# Patient Record
Sex: MALE | Race: WHITE | URBAN - METROPOLITAN AREA
[De-identification: names, ages, dates, MRNs, and addresses within clinical notes are randomized per-mention and may not be internally consistent; named-entity substitution may affect disease eponyms.]

---

## 2017-01-11 ENCOUNTER — CARE COORDINATION (OUTPATIENT)
Dept: GASTROENTEROLOGY | Facility: CLINIC | Age: 62
End: 2017-01-11

## 2017-01-11 DIAGNOSIS — K85.90 PANCREATITIS: Primary | ICD-10-CM

## 2017-01-12 ENCOUNTER — TRANSFERRED RECORDS (OUTPATIENT)
Dept: HEALTH INFORMATION MANAGEMENT | Facility: CLINIC | Age: 62
End: 2017-01-12

## 2017-01-12 ENCOUNTER — TELEPHONE (OUTPATIENT)
Dept: GASTROENTEROLOGY | Facility: CLINIC | Age: 62
End: 2017-01-12

## 2017-01-12 ENCOUNTER — CARE COORDINATION (OUTPATIENT)
Dept: GASTROENTEROLOGY | Facility: CLINIC | Age: 62
End: 2017-01-12

## 2017-01-12 NOTE — PROGRESS NOTES
Asked to call Prince because he has questions regarding scheduling his ERCP (see letter from Dr. Heredia 1/11/2017)    Prince states he would prefer to wait to schedule ERCP until after the hydrogen breath test results come back. Testing was done 1/11/2017. He is not sure how long it will take for the test results to be sent back to his clinic. He is aware we will need the test results sent to us here as well.     We will wait to hear from him. He is amenable to the plan.     Maggie CHERRY, RN Coordinator  Dr. Carolina, Dr. Heredia & Dr. Thompson  [facility]  [phone] #4

## 2017-01-12 NOTE — TELEPHONE ENCOUNTER
Called Prince to schedule ERCP. He is wondering why ERCP, he has had two done in the past. States that he also had an MRCP done recently. He says he lives 6 hours away and is concerned about fasting whilst taking about 7 shots of insulin per day. He asked me to speak to a nurse before I add him on the OR schedule.   Will route this to Maggie to call pt and address any concerns, then I will call him to schedule.     SR 01/12/2017  158p

## 2017-02-01 ENCOUNTER — CARE COORDINATION (OUTPATIENT)
Dept: GASTROENTEROLOGY | Facility: CLINIC | Age: 62
End: 2017-02-01

## 2017-02-01 NOTE — PROGRESS NOTES
Received call from Sowmya OROZCO at [facility], called to let us know Prince's Hydrogen breath test is done and she will fax the results to us.   Fax number given.     Maggie CHERRY RN Coordinator  Dr. Carolina, Dr. Heredia & Dr. Thompson  [facility]  [phone] #4

## 2017-02-02 ENCOUNTER — CARE COORDINATION (OUTPATIENT)
Dept: GASTROENTEROLOGY | Facility: CLINIC | Age: 62
End: 2017-02-02

## 2017-02-02 DIAGNOSIS — K58.0 IRRITABLE BOWEL SYNDROME WITH DIARRHEA: Primary | ICD-10-CM

## 2017-02-02 NOTE — PROGRESS NOTES
Called patient and let him know i will send RX for antibiotic to his pharmacy [phone], he also wanted to know what the next step was? He wasn't sure if he should be waiting until done with abx before doing ERCP. Also he is not on enzyme at this time, creon the last time 36,000 made him sick. We discussed trying another enzyme which i will discuss with Dr. Heredia and call patient back with recommendations and plan.

## 2017-02-03 ENCOUNTER — CARE COORDINATION (OUTPATIENT)
Dept: GASTROENTEROLOGY | Facility: CLINIC | Age: 62
End: 2017-02-03

## 2017-02-03 ENCOUNTER — TELEPHONE (OUTPATIENT)
Dept: GASTROENTEROLOGY | Facility: CLINIC | Age: 62
End: 2017-02-03

## 2017-02-03 NOTE — TELEPHONE ENCOUNTER
Prince is calling to get some possible dates for his ERCP:    Dates offered:   02/27/2017 at 2 PM   03/01/2017 at 11 AM  03/08/2017 at 8:15 AM    Patient knows these dates may not be available if he does not respond to me by Monday.      SR 02/03/201  350PM

## 2017-02-06 ENCOUNTER — TELEPHONE (OUTPATIENT)
Dept: GASTROENTEROLOGY | Facility: CLINIC | Age: 62
End: 2017-02-06

## 2017-02-06 ENCOUNTER — CARE COORDINATION (OUTPATIENT)
Dept: GASTROENTEROLOGY | Facility: CLINIC | Age: 62
End: 2017-02-06

## 2017-02-06 NOTE — PROGRESS NOTES
Called Prince because I received a message he had questions about the procedure.     Asked how long before he can be driven home after procedure: advised that it is recommended he stay in the area for 24 hours after the procedure. He will talk it over with his .     Advised to get pre-op physical and talk to PCP about his diabetes, especially since his blood sugars are so unstable.     Contact information verified for future questions/concerns.   Maggie CHERRY RN Coordinator  Dr. Carolina, Dr. Heredia & Dr. Thompson  [facility]  [phone] #4

## 2017-02-06 NOTE — TELEPHONE ENCOUNTER
Prince is informed that he is scheduled on 02/27/2017 at 2 PM with an arrival time of 12 P. Prince already has a  designated to pick him up. He stated that he recently had a pre-op physical, I advised that this needs to be done within 30 days of the procedure as anesthesia will be administered. Patient verbalized understanding. I also informed him that if it is outdated by a couple days, Dr. Heredia will update on the day of.   He knows he will need someone to monitor him for 24 hours post procedure.   All instructions will be mailed to Prince at his address listed in EPIC, it was verified.     SR 02/06/2017  1108a

## 2017-02-08 ENCOUNTER — CARE COORDINATION (OUTPATIENT)
Dept: GASTROENTEROLOGY | Facility: CLINIC | Age: 62
End: 2017-02-08

## 2017-02-08 NOTE — PROGRESS NOTES
Jason called wanting clarification in regards to the Xifaxin prescription. Advised he was to take it 2x day for 10 days as written and I believe the 6 refills were a mistake.   Verbalized understanding.     Maggie CHERRY RN Coordinator  Dr. Carolina, Dr. Heredia & Dr. Thompson  [facility]  [phone] #4

## 2017-02-15 DIAGNOSIS — K58.0 IRRITABLE BOWEL SYNDROME WITH DIARRHEA: Primary | ICD-10-CM

## 2017-02-24 ENCOUNTER — TRANSFERRED RECORDS (OUTPATIENT)
Dept: HEALTH INFORMATION MANAGEMENT | Facility: CLINIC | Age: 62
End: 2017-02-24

## 2017-02-27 ENCOUNTER — ANESTHESIA EVENT (OUTPATIENT)
Dept: SURGERY | Facility: CLINIC | Age: 62
DRG: 438 | End: 2017-02-27
Payer: MEDICARE

## 2017-02-27 ENCOUNTER — HOSPITAL ENCOUNTER (INPATIENT)
Facility: CLINIC | Age: 62
LOS: 5 days | Discharge: HOME | DRG: 438 | End: 2017-03-04
Attending: INTERNAL MEDICINE | Admitting: INTERNAL MEDICINE
Payer: MEDICARE

## 2017-02-27 ENCOUNTER — ANESTHESIA (OUTPATIENT)
Dept: SURGERY | Facility: CLINIC | Age: 62
DRG: 438 | End: 2017-02-27
Payer: MEDICARE

## 2017-02-27 ENCOUNTER — APPOINTMENT (OUTPATIENT)
Dept: GENERAL RADIOLOGY | Facility: CLINIC | Age: 62
DRG: 438 | End: 2017-02-27
Attending: INTERNAL MEDICINE
Payer: MEDICARE

## 2017-02-27 DIAGNOSIS — Z79.4 TYPE 2 DIABETES MELLITUS WITH HYPERGLYCEMIA, WITH LONG-TERM CURRENT USE OF INSULIN (H): ICD-10-CM

## 2017-02-27 DIAGNOSIS — K85.81 OTHER ACUTE PANCREATITIS WITH UNINFECTED NECROSIS: Primary | ICD-10-CM

## 2017-02-27 DIAGNOSIS — K59.01 SLOW TRANSIT CONSTIPATION: ICD-10-CM

## 2017-02-27 DIAGNOSIS — E11.65 TYPE 2 DIABETES MELLITUS WITH HYPERGLYCEMIA, WITH LONG-TERM CURRENT USE OF INSULIN (H): ICD-10-CM

## 2017-02-27 PROBLEM — K85.90 ACUTE PANCREATITIS: Status: ACTIVE | Noted: 2017-02-27

## 2017-02-27 PROBLEM — G89.18 POST-OP PAIN: Status: ACTIVE | Noted: 2017-02-27

## 2017-02-27 LAB
ALBUMIN SERPL-MCNC: 4.2 G/DL (ref 3.4–5)
ALP SERPL-CCNC: 59 U/L (ref 40–150)
ALT SERPL W P-5'-P-CCNC: 47 U/L (ref 0–70)
AMYLASE SERPL-CCNC: 141 U/L (ref 30–110)
AMYLASE SERPL-CCNC: 62 U/L (ref 30–110)
ANION GAP SERPL CALCULATED.3IONS-SCNC: 8 MMOL/L (ref 3–14)
AST SERPL W P-5'-P-CCNC: 15 U/L (ref 0–45)
BILIRUB SERPL-MCNC: 0.6 MG/DL (ref 0.2–1.3)
BUN SERPL-MCNC: 22 MG/DL (ref 7–30)
CALCIUM SERPL-MCNC: 8.9 MG/DL (ref 8.5–10.1)
CHLORIDE SERPL-SCNC: 100 MMOL/L (ref 94–109)
CO2 SERPL-SCNC: 27 MMOL/L (ref 20–32)
CREAT SERPL-MCNC: 1.2 MG/DL (ref 0.66–1.25)
ERCP: NORMAL
ERYTHROCYTE [DISTWIDTH] IN BLOOD BY AUTOMATED COUNT: 12.6 % (ref 10–15)
GFR SERPL CREATININE-BSD FRML MDRD: 62 ML/MIN/1.7M2
GLUCOSE BLDC GLUCOMTR-MCNC: 218 MG/DL (ref 70–99)
GLUCOSE BLDC GLUCOMTR-MCNC: 220 MG/DL (ref 70–99)
GLUCOSE BLDC GLUCOMTR-MCNC: 251 MG/DL (ref 70–99)
GLUCOSE BLDC GLUCOMTR-MCNC: 252 MG/DL (ref 70–99)
GLUCOSE SERPL-MCNC: 235 MG/DL (ref 70–99)
HCT VFR BLD AUTO: 36.4 % (ref 40–53)
HGB BLD-MCNC: 12.8 G/DL (ref 13.3–17.7)
INR PPP: 1.07 (ref 0.86–1.14)
LIPASE SERPL-CCNC: 2000 U/L (ref 73–393)
LIPASE SERPL-CCNC: 353 U/L (ref 73–393)
MCH RBC QN AUTO: 29.2 PG (ref 26.5–33)
MCHC RBC AUTO-ENTMCNC: 35.2 G/DL (ref 31.5–36.5)
MCV RBC AUTO: 83 FL (ref 78–100)
PLATELET # BLD AUTO: 213 10E9/L (ref 150–450)
POTASSIUM SERPL-SCNC: 4 MMOL/L (ref 3.4–5.3)
PROT SERPL-MCNC: 7.9 G/DL (ref 6.8–8.8)
RBC # BLD AUTO: 4.39 10E12/L (ref 4.4–5.9)
SODIUM SERPL-SCNC: 134 MMOL/L (ref 133–144)
WBC # BLD AUTO: 7.7 10E9/L (ref 4–11)

## 2017-02-27 PROCEDURE — 27210794 ZZH OR GENERAL SUPPLY STERILE: Performed by: INTERNAL MEDICINE

## 2017-02-27 PROCEDURE — 25000566 ZZH SEVOFLURANE, EA 15 MIN: Performed by: INTERNAL MEDICINE

## 2017-02-27 PROCEDURE — 12000001 ZZH R&B MED SURG/OB UMMC

## 2017-02-27 PROCEDURE — 40000170 ZZH STATISTIC PRE-PROCEDURE ASSESSMENT II: Performed by: INTERNAL MEDICINE

## 2017-02-27 PROCEDURE — 83690 ASSAY OF LIPASE: CPT | Performed by: INTERNAL MEDICINE

## 2017-02-27 PROCEDURE — 36415 COLL VENOUS BLD VENIPUNCTURE: CPT | Performed by: INTERNAL MEDICINE

## 2017-02-27 PROCEDURE — 36000061 ZZH SURGERY LEVEL 3 W FLUORO 1ST 30 MIN - UMMC: Performed by: INTERNAL MEDICINE

## 2017-02-27 PROCEDURE — 71000015 ZZH RECOVERY PHASE 1 LEVEL 2 EA ADDTL HR: Performed by: INTERNAL MEDICINE

## 2017-02-27 PROCEDURE — 71000014 ZZH RECOVERY PHASE 1 LEVEL 2 FIRST HR: Performed by: INTERNAL MEDICINE

## 2017-02-27 PROCEDURE — 25000128 H RX IP 250 OP 636: Performed by: NURSE ANESTHETIST, CERTIFIED REGISTERED

## 2017-02-27 PROCEDURE — C1769 GUIDE WIRE: HCPCS | Performed by: INTERNAL MEDICINE

## 2017-02-27 PROCEDURE — 25800025 ZZH RX 258: Performed by: NURSE ANESTHETIST, CERTIFIED REGISTERED

## 2017-02-27 PROCEDURE — 80053 COMPREHEN METABOLIC PANEL: CPT | Performed by: INTERNAL MEDICINE

## 2017-02-27 PROCEDURE — C1726 CATH, BAL DIL, NON-VASCULAR: HCPCS | Performed by: INTERNAL MEDICINE

## 2017-02-27 PROCEDURE — 25000125 ZZHC RX 250: Performed by: NURSE ANESTHETIST, CERTIFIED REGISTERED

## 2017-02-27 PROCEDURE — 0F7D8DZ DILATION OF PANCREATIC DUCT WITH INTRALUMINAL DEVICE, VIA NATURAL OR ARTIFICIAL OPENING ENDOSCOPIC: ICD-10-PCS | Performed by: INTERNAL MEDICINE

## 2017-02-27 PROCEDURE — C1877 STENT, NON-COAT/COV W/O DEL: HCPCS | Performed by: INTERNAL MEDICINE

## 2017-02-27 PROCEDURE — 85027 COMPLETE CBC AUTOMATED: CPT | Performed by: INTERNAL MEDICINE

## 2017-02-27 PROCEDURE — 25000128 H RX IP 250 OP 636: Performed by: NURSE PRACTITIONER

## 2017-02-27 PROCEDURE — A9270 NON-COVERED ITEM OR SERVICE: HCPCS | Performed by: INTERNAL MEDICINE

## 2017-02-27 PROCEDURE — 25800025 ZZH RX 258: Performed by: INTERNAL MEDICINE

## 2017-02-27 PROCEDURE — 82150 ASSAY OF AMYLASE: CPT | Performed by: INTERNAL MEDICINE

## 2017-02-27 PROCEDURE — 40000277 XR SURGERY CARM FLUORO LESS THAN 5 MIN W STILLS: Mod: TC

## 2017-02-27 PROCEDURE — 25000125 ZZHC RX 250: Performed by: INTERNAL MEDICINE

## 2017-02-27 PROCEDURE — 37000008 ZZH ANESTHESIA TECHNICAL FEE, 1ST 30 MIN: Performed by: INTERNAL MEDICINE

## 2017-02-27 PROCEDURE — 85610 PROTHROMBIN TIME: CPT | Performed by: INTERNAL MEDICINE

## 2017-02-27 PROCEDURE — 99220 ZZC INITIAL OBSERVATION CARE,LEVL III: CPT | Mod: AI | Performed by: INTERNAL MEDICINE

## 2017-02-27 PROCEDURE — 99207 ZZC APP CREDIT; MD BILLING SHARED VISIT: CPT | Performed by: NURSE PRACTITIONER

## 2017-02-27 PROCEDURE — 25000125 ZZHC RX 250: Performed by: ANESTHESIOLOGY

## 2017-02-27 PROCEDURE — 00000146 ZZHCL STATISTIC GLUCOSE BY METER IP

## 2017-02-27 PROCEDURE — 27211024 ZZHC OR SUPPLY OTHER OPNP: Performed by: INTERNAL MEDICINE

## 2017-02-27 PROCEDURE — 37000009 ZZH ANESTHESIA TECHNICAL FEE, EACH ADDTL 15 MIN: Performed by: INTERNAL MEDICINE

## 2017-02-27 PROCEDURE — 36000059 ZZH SURGERY LEVEL 3 EA 15 ADDTL MIN UMMC: Performed by: INTERNAL MEDICINE

## 2017-02-27 PROCEDURE — C1725 CATH, TRANSLUMIN NON-LASER: HCPCS | Performed by: INTERNAL MEDICINE

## 2017-02-27 PROCEDURE — 25000132 ZZH RX MED GY IP 250 OP 250 PS 637: Mod: GY | Performed by: NURSE PRACTITIONER

## 2017-02-27 DEVICE — STENT ADVANIX PANCREA PIGTAIL 3FRX13CM M00536850
Type: IMPLANTABLE DEVICE | Site: PANCREATIC DUCT | Status: NON-FUNCTIONAL
Removed: 2017-03-20

## 2017-02-27 RX ORDER — NICOTINE POLACRILEX 4 MG
15-30 LOZENGE BUCCAL
Status: DISCONTINUED | OUTPATIENT
Start: 2017-02-27 | End: 2017-03-04 | Stop reason: HOSPADM

## 2017-02-27 RX ORDER — FLUMAZENIL 0.1 MG/ML
0.2 INJECTION, SOLUTION INTRAVENOUS
Status: ACTIVE | OUTPATIENT
Start: 2017-02-27 | End: 2017-02-28

## 2017-02-27 RX ORDER — HYDROMORPHONE HYDROCHLORIDE 1 MG/ML
.3-.5 INJECTION, SOLUTION INTRAMUSCULAR; INTRAVENOUS; SUBCUTANEOUS
Status: DISCONTINUED | OUTPATIENT
Start: 2017-02-27 | End: 2017-02-28

## 2017-02-27 RX ORDER — ONDANSETRON 2 MG/ML
4 INJECTION INTRAMUSCULAR; INTRAVENOUS EVERY 30 MIN PRN
Status: DISCONTINUED | OUTPATIENT
Start: 2017-02-27 | End: 2017-02-27 | Stop reason: HOSPADM

## 2017-02-27 RX ORDER — LIDOCAINE 40 MG/G
CREAM TOPICAL
Status: DISCONTINUED | OUTPATIENT
Start: 2017-02-27 | End: 2017-02-27 | Stop reason: HOSPADM

## 2017-02-27 RX ORDER — ONDANSETRON 2 MG/ML
4 INJECTION INTRAMUSCULAR; INTRAVENOUS EVERY 6 HOURS PRN
Status: DISCONTINUED | OUTPATIENT
Start: 2017-02-27 | End: 2017-03-04 | Stop reason: HOSPADM

## 2017-02-27 RX ORDER — NALOXONE HYDROCHLORIDE 0.4 MG/ML
.1-.4 INJECTION, SOLUTION INTRAMUSCULAR; INTRAVENOUS; SUBCUTANEOUS
Status: DISCONTINUED | OUTPATIENT
Start: 2017-02-27 | End: 2017-02-28

## 2017-02-27 RX ORDER — NALOXONE HYDROCHLORIDE 0.4 MG/ML
.1-.4 INJECTION, SOLUTION INTRAMUSCULAR; INTRAVENOUS; SUBCUTANEOUS
Status: DISCONTINUED | OUTPATIENT
Start: 2017-02-27 | End: 2017-03-04 | Stop reason: HOSPADM

## 2017-02-27 RX ORDER — FENTANYL CITRATE 50 UG/ML
25-50 INJECTION, SOLUTION INTRAMUSCULAR; INTRAVENOUS
Status: DISCONTINUED | OUTPATIENT
Start: 2017-02-27 | End: 2017-02-27 | Stop reason: HOSPADM

## 2017-02-27 RX ORDER — HYDROMORPHONE HYDROCHLORIDE 1 MG/ML
.3-.5 INJECTION, SOLUTION INTRAMUSCULAR; INTRAVENOUS; SUBCUTANEOUS EVERY 5 MIN PRN
Status: DISCONTINUED | OUTPATIENT
Start: 2017-02-27 | End: 2017-02-27 | Stop reason: HOSPADM

## 2017-02-27 RX ORDER — SODIUM CHLORIDE, SODIUM LACTATE, POTASSIUM CHLORIDE, CALCIUM CHLORIDE 600; 310; 30; 20 MG/100ML; MG/100ML; MG/100ML; MG/100ML
INJECTION, SOLUTION INTRAVENOUS CONTINUOUS PRN
Status: DISCONTINUED | OUTPATIENT
Start: 2017-02-27 | End: 2017-02-27

## 2017-02-27 RX ORDER — DEXTROSE MONOHYDRATE 25 G/50ML
25-50 INJECTION, SOLUTION INTRAVENOUS
Status: DISCONTINUED | OUTPATIENT
Start: 2017-02-27 | End: 2017-03-04 | Stop reason: HOSPADM

## 2017-02-27 RX ORDER — HYDROMORPHONE HYDROCHLORIDE 1 MG/ML
.3-.5 INJECTION, SOLUTION INTRAMUSCULAR; INTRAVENOUS; SUBCUTANEOUS EVERY 10 MIN PRN
Status: DISCONTINUED | OUTPATIENT
Start: 2017-02-27 | End: 2017-02-27 | Stop reason: HOSPADM

## 2017-02-27 RX ORDER — GLYCOPYRROLATE 0.2 MG/ML
INJECTION, SOLUTION INTRAMUSCULAR; INTRAVENOUS PRN
Status: DISCONTINUED | OUTPATIENT
Start: 2017-02-27 | End: 2017-02-27

## 2017-02-27 RX ORDER — HYDRALAZINE HYDROCHLORIDE 20 MG/ML
2.5-5 INJECTION INTRAMUSCULAR; INTRAVENOUS EVERY 10 MIN PRN
Status: DISCONTINUED | OUTPATIENT
Start: 2017-02-27 | End: 2017-02-27 | Stop reason: HOSPADM

## 2017-02-27 RX ORDER — ONDANSETRON 2 MG/ML
INJECTION INTRAMUSCULAR; INTRAVENOUS PRN
Status: DISCONTINUED | OUTPATIENT
Start: 2017-02-27 | End: 2017-02-27

## 2017-02-27 RX ORDER — PROCHLORPERAZINE MALEATE 5 MG
5-10 TABLET ORAL EVERY 6 HOURS PRN
Status: DISCONTINUED | OUTPATIENT
Start: 2017-02-27 | End: 2017-03-04 | Stop reason: HOSPADM

## 2017-02-27 RX ORDER — LIDOCAINE HYDROCHLORIDE 20 MG/ML
INJECTION, SOLUTION INFILTRATION; PERINEURAL PRN
Status: DISCONTINUED | OUTPATIENT
Start: 2017-02-27 | End: 2017-02-27

## 2017-02-27 RX ORDER — LIDOCAINE 40 MG/G
CREAM TOPICAL
Status: DISCONTINUED | OUTPATIENT
Start: 2017-02-27 | End: 2017-03-04 | Stop reason: HOSPADM

## 2017-02-27 RX ORDER — ACETAMINOPHEN 325 MG/1
975 TABLET ORAL EVERY 8 HOURS PRN
Status: DISCONTINUED | OUTPATIENT
Start: 2017-02-27 | End: 2017-03-04 | Stop reason: HOSPADM

## 2017-02-27 RX ORDER — ONDANSETRON 4 MG/1
4 TABLET, ORALLY DISINTEGRATING ORAL EVERY 6 HOURS PRN
Status: DISCONTINUED | OUTPATIENT
Start: 2017-02-27 | End: 2017-03-04 | Stop reason: HOSPADM

## 2017-02-27 RX ORDER — SODIUM CHLORIDE, SODIUM LACTATE, POTASSIUM CHLORIDE, CALCIUM CHLORIDE 600; 310; 30; 20 MG/100ML; MG/100ML; MG/100ML; MG/100ML
INJECTION, SOLUTION INTRAVENOUS CONTINUOUS
Status: DISCONTINUED | OUTPATIENT
Start: 2017-02-27 | End: 2017-02-27 | Stop reason: HOSPADM

## 2017-02-27 RX ORDER — MEPERIDINE HYDROCHLORIDE 25 MG/ML
12.5 INJECTION INTRAMUSCULAR; INTRAVENOUS; SUBCUTANEOUS
Status: DISCONTINUED | OUTPATIENT
Start: 2017-02-27 | End: 2017-02-27 | Stop reason: HOSPADM

## 2017-02-27 RX ORDER — CIPROFLOXACIN 2 MG/ML
INJECTION, SOLUTION INTRAVENOUS PRN
Status: DISCONTINUED | OUTPATIENT
Start: 2017-02-27 | End: 2017-02-27

## 2017-02-27 RX ORDER — HYDROCHLOROTHIAZIDE 12.5 MG/1
12.5 CAPSULE ORAL DAILY
Status: DISCONTINUED | OUTPATIENT
Start: 2017-02-28 | End: 2017-03-02

## 2017-02-27 RX ORDER — CARVEDILOL 6.25 MG/1
6.25 TABLET ORAL DAILY
Status: DISCONTINUED | OUTPATIENT
Start: 2017-02-28 | End: 2017-03-04 | Stop reason: HOSPADM

## 2017-02-27 RX ORDER — ATORVASTATIN CALCIUM 40 MG/1
40 TABLET, FILM COATED ORAL DAILY
Status: DISCONTINUED | OUTPATIENT
Start: 2017-02-28 | End: 2017-03-04 | Stop reason: HOSPADM

## 2017-02-27 RX ORDER — PROPOFOL 10 MG/ML
INJECTION, EMULSION INTRAVENOUS PRN
Status: DISCONTINUED | OUTPATIENT
Start: 2017-02-27 | End: 2017-02-27

## 2017-02-27 RX ORDER — LISINOPRIL 20 MG/1
20 TABLET ORAL DAILY
Status: DISCONTINUED | OUTPATIENT
Start: 2017-02-28 | End: 2017-03-04 | Stop reason: HOSPADM

## 2017-02-27 RX ORDER — PROCHLORPERAZINE 25 MG
25 SUPPOSITORY, RECTAL RECTAL EVERY 12 HOURS PRN
Status: DISCONTINUED | OUTPATIENT
Start: 2017-02-27 | End: 2017-03-04 | Stop reason: HOSPADM

## 2017-02-27 RX ORDER — ONDANSETRON 4 MG/1
4 TABLET, ORALLY DISINTEGRATING ORAL EVERY 30 MIN PRN
Status: DISCONTINUED | OUTPATIENT
Start: 2017-02-27 | End: 2017-02-27 | Stop reason: HOSPADM

## 2017-02-27 RX ORDER — SODIUM CHLORIDE, SODIUM LACTATE, POTASSIUM CHLORIDE, CALCIUM CHLORIDE 600; 310; 30; 20 MG/100ML; MG/100ML; MG/100ML; MG/100ML
INJECTION, SOLUTION INTRAVENOUS CONTINUOUS
Status: DISCONTINUED | OUTPATIENT
Start: 2017-02-27 | End: 2017-03-03

## 2017-02-27 RX ORDER — FENTANYL CITRATE 50 UG/ML
INJECTION, SOLUTION INTRAMUSCULAR; INTRAVENOUS PRN
Status: DISCONTINUED | OUTPATIENT
Start: 2017-02-27 | End: 2017-02-27

## 2017-02-27 RX ORDER — SODIUM CHLORIDE, SODIUM LACTATE, POTASSIUM CHLORIDE, CALCIUM CHLORIDE 600; 310; 30; 20 MG/100ML; MG/100ML; MG/100ML; MG/100ML
INJECTION, SOLUTION INTRAVENOUS CONTINUOUS
Status: DISCONTINUED | OUTPATIENT
Start: 2017-02-27 | End: 2017-02-27

## 2017-02-27 RX ORDER — NEOSTIGMINE METHYLSULFATE 1 MG/ML
VIAL (ML) INJECTION PRN
Status: DISCONTINUED | OUTPATIENT
Start: 2017-02-27 | End: 2017-02-27

## 2017-02-27 RX ORDER — CARBOXYMETHYLCELLULOSE SODIUM 5 MG/ML
1 SOLUTION/ DROPS OPHTHALMIC 3 TIMES DAILY PRN
Status: DISCONTINUED | OUTPATIENT
Start: 2017-02-27 | End: 2017-03-04 | Stop reason: HOSPADM

## 2017-02-27 RX ORDER — AMOXICILLIN 250 MG
1-2 CAPSULE ORAL 2 TIMES DAILY PRN
Status: DISCONTINUED | OUTPATIENT
Start: 2017-02-27 | End: 2017-03-04 | Stop reason: HOSPADM

## 2017-02-27 RX ORDER — CYCLOSPORINE 0.5 MG/ML
1 EMULSION OPHTHALMIC 2 TIMES DAILY
Status: DISCONTINUED | OUTPATIENT
Start: 2017-02-27 | End: 2017-03-04 | Stop reason: HOSPADM

## 2017-02-27 RX ORDER — SPIRONOLACTONE 25 MG/1
25 TABLET ORAL 2 TIMES DAILY
Status: DISCONTINUED | OUTPATIENT
Start: 2017-02-28 | End: 2017-03-04 | Stop reason: HOSPADM

## 2017-02-27 RX ORDER — INDOMETHACIN 50 MG/1
100 SUPPOSITORY RECTAL
Status: DISCONTINUED | OUTPATIENT
Start: 2017-02-27 | End: 2017-02-27 | Stop reason: HOSPADM

## 2017-02-27 RX ORDER — NALOXONE HYDROCHLORIDE 0.4 MG/ML
.1-.4 INJECTION, SOLUTION INTRAMUSCULAR; INTRAVENOUS; SUBCUTANEOUS
Status: DISCONTINUED | OUTPATIENT
Start: 2017-02-27 | End: 2017-02-27 | Stop reason: HOSPADM

## 2017-02-27 RX ADMIN — HYDRALAZINE HYDROCHLORIDE 5 MG: 20 INJECTION INTRAMUSCULAR; INTRAVENOUS at 19:02

## 2017-02-27 RX ADMIN — ROCURONIUM BROMIDE 50 MG: 10 INJECTION INTRAVENOUS at 14:07

## 2017-02-27 RX ADMIN — HYDROMORPHONE HYDROCHLORIDE 0.5 MG: 10 INJECTION, SOLUTION INTRAMUSCULAR; INTRAVENOUS; SUBCUTANEOUS at 21:47

## 2017-02-27 RX ADMIN — FENTANYL CITRATE 100 MCG: 50 INJECTION, SOLUTION INTRAMUSCULAR; INTRAVENOUS at 14:07

## 2017-02-27 RX ADMIN — SODIUM CHLORIDE, POTASSIUM CHLORIDE, SODIUM LACTATE AND CALCIUM CHLORIDE: 600; 310; 30; 20 INJECTION, SOLUTION INTRAVENOUS at 13:45

## 2017-02-27 RX ADMIN — SODIUM CHLORIDE, POTASSIUM CHLORIDE, SODIUM LACTATE AND CALCIUM CHLORIDE 200 ML/HR: 600; 310; 30; 20 INJECTION, SOLUTION INTRAVENOUS at 18:09

## 2017-02-27 RX ADMIN — PROPOFOL 200 MG: 10 INJECTION, EMULSION INTRAVENOUS at 14:07

## 2017-02-27 RX ADMIN — FENTANYL CITRATE 25 MCG: 50 INJECTION, SOLUTION INTRAMUSCULAR; INTRAVENOUS at 20:15

## 2017-02-27 RX ADMIN — CIPROFLOXACIN 400 MG: 2 INJECTION INTRAVENOUS at 15:55

## 2017-02-27 RX ADMIN — HYDRALAZINE HYDROCHLORIDE 2.5 MG: 20 INJECTION INTRAMUSCULAR; INTRAVENOUS at 18:39

## 2017-02-27 RX ADMIN — FENTANYL CITRATE 50 MCG: 50 INJECTION, SOLUTION INTRAMUSCULAR; INTRAVENOUS at 16:38

## 2017-02-27 RX ADMIN — FENTANYL CITRATE 50 MCG: 50 INJECTION, SOLUTION INTRAMUSCULAR; INTRAVENOUS at 16:13

## 2017-02-27 RX ADMIN — LIDOCAINE HYDROCHLORIDE 100 MG: 20 INJECTION, SOLUTION INFILTRATION; PERINEURAL at 14:07

## 2017-02-27 RX ADMIN — BIMATOPROST 1 DROP: 0.1 SOLUTION/ DROPS OPHTHALMIC at 23:19

## 2017-02-27 RX ADMIN — HYDRALAZINE HYDROCHLORIDE 5 MG: 20 INJECTION INTRAMUSCULAR; INTRAVENOUS at 20:27

## 2017-02-27 RX ADMIN — FENTANYL CITRATE 25 MCG: 50 INJECTION, SOLUTION INTRAMUSCULAR; INTRAVENOUS at 20:05

## 2017-02-27 RX ADMIN — SODIUM CHLORIDE, POTASSIUM CHLORIDE, SODIUM LACTATE AND CALCIUM CHLORIDE: 600; 310; 30; 20 INJECTION, SOLUTION INTRAVENOUS at 16:00

## 2017-02-27 RX ADMIN — FENTANYL CITRATE 25 MCG: 50 INJECTION, SOLUTION INTRAMUSCULAR; INTRAVENOUS at 20:30

## 2017-02-27 RX ADMIN — Medication 4 MG: at 16:56

## 2017-02-27 RX ADMIN — ONDANSETRON 4 MG: 2 INJECTION INTRAMUSCULAR; INTRAVENOUS at 14:28

## 2017-02-27 RX ADMIN — GLYCOPYRROLATE 0.6 MG: 0.2 INJECTION, SOLUTION INTRAMUSCULAR; INTRAVENOUS at 16:56

## 2017-02-27 RX ADMIN — MIDAZOLAM HYDROCHLORIDE 2 MG: 1 INJECTION, SOLUTION INTRAMUSCULAR; INTRAVENOUS at 13:54

## 2017-02-27 ASSESSMENT — PAIN DESCRIPTION - DESCRIPTORS: DESCRIPTORS: ACHING;CONSTANT;SHARP

## 2017-02-27 NOTE — ANESTHESIA PREPROCEDURE EVALUATION
"       Physical Exam  Normal systems: cardiovascular, pulmonary and dental    Airway   Mallampati: II  TM distance: >3 FB  Neck ROM: full    Dental     Cardiovascular       Pulmonary     Other findings: Multiple fillings. Pat c/o of left upper molars not feeling right.                 Anesthesia Plan      History & Physical Review  History and physical reviewed and following examination; no interval change.    ASA Status:  2 .    NPO Status:  > 8 hours    Plan for General and ETT with Intravenous induction. Maintenance will be Balanced.    PONV prophylaxis:  Ondansetron (or other 5HT-3) and Dexamethasone or Solumedrol     Procedure:   ENDOSCOPIC RETROGRADE CHOLANGIOPANCREATOGRAM (N/A Mouth) Endoscopic Retrograde Cholangiopancreatogram       Anesthesia type: General    Pre-op diagnosis: Chronic Pancreatitis     [facility]: [facility]    Surgeon: Reuben Heredia MD    H/O chronic recurrent (pancreatitis and chronic abdominal pain  for ERCP. On no pain pain. Occ 8-9/10. Now 0/10, unless palpation over tail of pancreas.    Plan:GAET, Check POC FS.  QR=832 at 12:25pm  ttok no insulin today  No Lantus. noramlly 25 U lantus qhs - last night took full dose.No rapid acting today.    EKG at outside clinic NSR 75 No acute change. Report sent, but no rhythm strip.    Decreased ROM of shoulders. Seems adequate for postioning in prone. (not able to reach back pocekts)      Postoperative Care  Postoperative pain management:  IV analgesics.      Consents  Anesthetic plan, risks, benefits and alternatives discussed with:  Patient..                    Prince Agarwal [[id number]]  Male - 61 year old - [birth year]  ENDOSCOPIC RETROGRADE CHOLANGIOPANCREATOGRAM (N/A Mouth)       Preprocedure Vitals      BP:   141/80   Pulse:   83   Resp:   16   SpO2:   97   Temp:   36.4  C (97.5  F)   Height:   1.753 m (5' 9\")  (02/27/17)   Weight:   117.2 kg (258 lb 6.1 oz)  (02/27/17)   BMI:   38.24   IBW:   70.7 kg (155 lb 15.1 oz)   Last " edited 02/27/17 1159 by AK          Allergies      CREON [PANCRELIPASE], NEXIUM [ESOMEPRAZOLE], TOPROL XL [METOPROLOL]       Prescription Medications         Last Taken Last Updated     Carvedilol (COREG PO)          Spironolactone (ALDACTONE PO)          METFORMIN HCL PO          insulin aspart (INSULIN ASPART) 100 UNIT/ML injection          rifaximin (XIFAXAN) 550 MG TABS tablet          bimatoprost (LUMIGAN) 0.01 % SOLN    Taking 12/27/16 0700     RANITIDINE HCL PO    Taking 12/27/16 0700     FENOFIBRATE PO    Taking 12/27/16 0700     hydrochlorothiazide (MICROZIDE) 12.5 MG capsule    Taking 12/27/16 0700     Ezetimibe (ZETIA PO)    Taking 12/27/16 0700     vitamin D (ERGOCALCIFEROL) 50065 UNIT capsule    Taking 12/27/16 0700     ACETAMINOPHEN PO    Taking 12/27/16 0700     mometasone (NASONEX) 50 MCG/ACT spray    Taking 12/27/16 0700     insulin aspart prot & aspart (NOVOLOG MIX 70/30 FLEXPEN) injection    Taking 12/27/16 0700     insulin glargine (LANTUS SOLOSTAR) 100 UNIT/ML injection    Taking 12/27/16 0700     albuterol (PROAIR HFA/PROVENTIL HFA/VENTOLIN HFA) 108 (90 BASE) MCG/ACT Inhaler    Taking 12/27/16 0700     ASPIRIN PO    Taking 12/27/16 0700     LISINOPRIL PO    Taking 12/27/16 0700     Diclofenac Sodium (VOLTAREN PO)    Taking 12/27/16 0700     ATORVASTATIN CALCIUM PO    Taking 12/27/16 0700     Cholecalciferol (VITAMIN D3 PO)    Taking 12/27/16 0700     cycloSPORINE (RESTASIS) 0.05 % ophthalmic emulsion    Taking 12/27/16 0700     cetirizine (ZYRTEC) 10 MG tablet    Taking 12/27/16 0700     carboxymethylcellulose (REFRESH PLUS) 0.5 % SOLN ophthalmic solution    Taking 12/27/16 0700     ammonium lactate (LAC-HYDRIN) 12 % lotion    Taking 12/27/16 0700     cholestyramine (QUESTRAN) 4 G Packet    More than a month 02/24/17 1005       Hematology Labs        No relevant labs found       Electrolyte Labs        No relevant labs found       Other Labs        No relevant labs found        Substance  History      Smoking Status: Never Smoker     Smokeless Tobacco Status: Unknown     Alcohol use: No     Drug use: No       Facility Administered Medications      lidocaine 1 % 1 mL    lidocaine (LMX4) kit        sodium chloride (PF) 0.9% PF flush 3 mL    sodium chloride (PF) 0.9% PF flush 3 mL        sodium chloride (PF) 0.9% PF flush 3 mL    indomethacin (INDOCIN) 50 MG Suppository 100 mg        lidocaine 1 % 1 mL    lidocaine (LMX4) kit        sodium chloride (PF) 0.9% PF flush 3 mL    sodium chloride (PF) 0.9% PF flush 3 mL        lactated ringers infusion           Anesthesia Family History      No family medical history recorded       Problem List      No problems recorded       Medical History        Diabetes (H) Hypertension     Sleep apnea Glaucoma     Coronary artery disease        Surgical History      STENT, CORONARY, TANISHA BACK SURGERY     SHOULDER SURGERY CARPAL TUNNEL RELEASE RT/LT     UPPER GI ENDOSCOPY           Last Written Preprocedure Note      No anesthesia note exists             .

## 2017-02-27 NOTE — IP AVS SNAPSHOT
MRN:[id number]                      After Visit Summary   2/27/2017    Prince Agarwal    MRN: [id number]           Thank you!     Thank you for choosing [facility] for your care. Our goal is always to provide you with excellent care. Hearing back from our patients is one way we can continue to improve our services. Please take a few minutes to complete the written survey that you may receive in the mail after you visit with us. Thank you!        Patient Information     Date Of Birth          1955        About your hospital stay     You were admitted on:  February 27, 2017 You last received care in the:  [facility]    You were discharged on:  March 4, 2017        Reason for your hospital stay       Post ERCP Pancreatitis                  Who to Call     For medical emergencies, please call 911.  For non-urgent questions about your medical care, please call your primary care provider or clinic, [phone]  For questions related to your surgery, please call your surgery clinic        Attending Provider     Provider Specialty    Reuben Heredia MD Gastroenterology    RESIDENT, PHYSICAN UNLICENSED --    Modesto Yun MD Internal Medicine    Mt Marroquin MD Internal Medicine       Primary Care Provider Office Phone # Fax #    Brenda Mejia [phone]       [place] [address] [address] [facility] [address]         When to contact your care team       Please seek medical attention if you develop severe abdominal pain or fevers and chills                  After Care Instructions     Activity       Your activity upon discharge: activity as tolerated            Diet       Follow this diet upon discharge: Orders Placed This Encounter      Full Liquid Diet. You can advance this to regular adult diet as tolerated            Discharge Instructions       Resume pre procedure diet            Discharge Instructions       Restart home  "medications.            Monitor and record       blood glucose 4 times a day, before meals and at bedtime.  You can increase your lantus by 2-3 units morning and evening for BG > 200 in fasting state till being seen by PCP                  Follow-up Appointments     Adult [facility] Follow-up and recommended labs and tests       GI team will call you with an appointment for repeat ERCP within 2 weeks  Please follow up with your PCP within 1 week time  Follow up/ establish care  appointment has been made for you with Diabetes & endocrinology at [facility]   Appointments on [place] and/or [facility] (with [facility] or [facility] provider or service). Call [phone] if you haven't heard regarding these appointments within 7 days of discharge.                  Further instructions from your care team       We have scheduled an appointment for you on Thursday 5/4 at 1:00 pm with Dr. Pedro Luis Interiano to establish care and management of diabetes.  [facility]  [address] [place] [address], [place] [address]  Phone #: [phone]    Pending Results     Date and Time Order Name Status Description    3/1/2017 2101 Blood culture Preliminary     3/1/2017 2101 Blood culture Preliminary             Statement of Approval     Ordered          03/04/17 1054  I have reviewed and agree with all the recommendations and orders detailed in this document.  EFFECTIVE NOW     Approved and electronically signed by:  Mt Marroquin MD             Admission Information     Date & Time Provider Department Dept. Phone    2/27/2017 Mt Marroquin MD [address] [facility] [place] [phone]      Your Vitals Were     Blood Pressure Pulse Temperature Respirations Height Weight    131/65 (BP Location: Right arm) 71 98.6  F (37  C) (Oral) 16 1.753 m (5' 9\") 115.9 kg (255 lb 9.6 oz)    Pulse Oximetry BMI (Body Mass Index)                94% 37.75 kg/m2          MyChart Information     " "[employer] lets you send messages to your doctor, view your test results, renew your prescriptions, schedule appointments and more. To sign up, go to [email or web address] . Click on \"Log in\" on the left side of the screen, which will take you to the Welcome page. Then click on \"Sign up Now\" on the right side of the page.     You will be asked to enter the access code listed below, as well as some personal information. Please follow the directions to create your username and password.     Your access code is: [email or web address]  Expires: 3/13/2017  6:31 AM     Your access code will  in 90 days. If you need help or a new code, please call your [facility] clinic or [phone].        Care EveryWhere ID     This is your Care EveryWhere ID. This could be used by other organizations to access your [facility] medical records  [id number]           Review of your medicines      START taking        Dose / Directions    HYDROmorphone 2 MG tablet   Commonly known as:  DILAUDID        Dose:  1 mg   Take 0.5 tablets (1 mg) by mouth every 3 hours as needed for moderate to severe pain   Quantity:  30 tablet   Refills:  0       senna-docusate 8.6-50 MG per tablet   Commonly known as:  SENOKOT-S;PERICOLACE   Used for:  Slow transit constipation        Dose:  2 tablet   Take 2 tablets by mouth 2 times daily   Quantity:  100 tablet   Refills:  1         CONTINUE these medicines which may have CHANGED, or have new prescriptions. If we are uncertain of the size of tablets/capsules you have at home, strength may be listed as something that might have changed.        Dose / Directions    * insulin aspart 100 UNIT/ML injection   Commonly known as:  NovoLOG PEN   This may have changed:    - how much to take  - additional instructions   Used for:  Type 2 diabetes mellitus with hyperglycemia, with long-term current use of insulin (H)        Dose:  1-10 Units   Inject 1-10 Units Subcutaneous 3 times daily (with meals) HIGH INSULIN RESISTANCE " DOSING    Do Not give if Pre-Meal BG < 140.  140 - 164 give 1 unit.   165 - 189 give 2 units.   190 - 214 give 3 units.   215 - 239 give 4 units.  240 - 264 give 5 units.   265 - 289 give 6 units.   290 - 314 give 7 units.  315 - 339 give 8 units.  340 - 364 give 9 units. = or > 365 give 10 units   Refills:  0       * insulin aspart 100 UNIT/ML injection   Commonly known as:  NovoLOG PEN   This may have changed:  You were already taking a medication with the same name, and this prescription was added. Make sure you understand how and when to take each.   Used for:  Type 2 diabetes mellitus with hyperglycemia, with long-term current use of insulin (H)        Dose:  1-7 Units   Inject 1-7 Units Subcutaneous At Bedtime For  - 224 give 1 units.  For  - 249 give 2 units.  For  - 274 give 3 units.  For  - 299 give 4 units.  For  - 324 give 5 units.  For  - 349 give 6 units.  For BG greater than or equal to 350 give 7 units.   Refills:  0       * insulin aspart 100 UNIT/ML injection   Commonly known as:  NovoLOG PEN   This may have changed:  You were already taking a medication with the same name, and this prescription was added. Make sure you understand how and when to take each.   Used for:  Type 2 diabetes mellitus with hyperglycemia, with long-term current use of insulin (H)        DOSE:  1 units per 10 grams of carbohydrate TID with meals and snacks    Only chart total amount of units given.  Do not give if pre-prandial glucose is less than 60 mg/dL.   Refills:  0       insulin glargine 100 UNIT/ML injection   Commonly known as:  LANTUS SOLOSTAR   This may have changed:    - how much to take  - when to take this   Used for:  Type 2 diabetes mellitus with hyperglycemia, with long-term current use of insulin (H)        Dose:  25 Units   Inject 25 Units Subcutaneous 2 times daily 25 units evening   Refills:  0       * Notice:  This list has 3 medication(s) that are the same as other  medications prescribed for you. Read the directions carefully, and ask your doctor or other care provider to review them with you.      CONTINUE these medicines which have NOT CHANGED        Dose / Directions    ACETAMINOPHEN PO        Refills:  0       albuterol 108 (90 BASE) MCG/ACT Inhaler   Commonly known as:  PROAIR HFA/PROVENTIL HFA/VENTOLIN HFA        Dose:  2 puff   Inhale 2 puffs into the lungs every 6 hours   Refills:  0       ALDACTONE PO        Dose:  25 mg   Take 25 mg by mouth 2 times daily   Refills:  0       ammonium lactate 12 % lotion   Commonly known as:  LAC-HYDRIN        Apply topically 2 times daily   Refills:  0       ASPIRIN PO        Dose:  325 mg   Take 325 mg by mouth daily   Refills:  0       ATORVASTATIN CALCIUM PO        Dose:  40 mg   Take 40 mg by mouth daily   Refills:  0       bimatoprost 0.01 % Soln   Commonly known as:  LUMIGAN        Dose:  1 drop   1 drop At Bedtime   Refills:  0       carboxymethylcellulose 0.5 % Soln ophthalmic solution   Commonly known as:  REFRESH PLUS        Dose:  1 drop   1 drop 3 times daily as needed for dry eyes   Refills:  0       cetirizine 10 MG tablet   Commonly known as:  zyrTEC        Dose:  10 mg   Take 10 mg by mouth daily   Refills:  0       cholestyramine 4 G Packet   Commonly known as:  QUESTRAN        Dose:  1 packet   Take 1 packet by mouth 3 times daily (with meals)   Refills:  0       COREG PO        Dose:  6.25 mg   Take 6.25 mg by mouth daily   Refills:  0       cycloSPORINE 0.05 % ophthalmic emulsion   Commonly known as:  RESTASIS        Dose:  1 drop   1 drop 2 times daily   Refills:  0       hydrochlorothiazide 12.5 MG capsule   Commonly known as:  MICROZIDE        Dose:  12.5 mg   Take 12.5 mg by mouth daily   Refills:  0       LISINOPRIL PO        Dose:  20 mg   Take 20 mg by mouth daily   Refills:  0       METFORMIN HCL PO        Dose:  1000 mg   Take 1,000 mg by mouth 2 times daily (with meals)   Refills:  0       mometasone 50  MCG/ACT spray   Commonly known as:  NASONEX        Dose:  2 spray   Spray 2 sprays into both nostrils daily   Refills:  0       RANITIDINE HCL PO        Dose:  150 mg   Take 150 mg by mouth 2 times daily   Refills:  0       vitamin D 86950 UNIT capsule   Commonly known as:  ERGOCALCIFEROL        Dose:  23963 Units   Take 50,000 Units by mouth   Refills:  0       VITAMIN D3 PO        Dose:  1000 Units   Take 1,000 Units by mouth   Refills:  0       ZETIA PO        Dose:  10 mg   Take 10 mg by mouth   Refills:  0         STOP taking     NovoLOG MIX 70/30 FLEXPEN injection   Generic drug:  insulin aspart prot & aspart           RIFAXIMIN PO           VOLTAREN PO                Where to get your medicines      These medications were sent to [facility] [place], [place] - [address] [address]     Phone:  [phone]     senna-docusate 8.6-50 MG per tablet         Some of these will need a paper prescription and others can be bought over the counter. Ask your nurse if you have questions.     Bring a paper prescription for each of these medications     HYDROmorphone 2 MG tablet       You don't need a prescription for these medications     insulin aspart 100 UNIT/ML injection    insulin aspart 100 UNIT/ML injection    insulin aspart 100 UNIT/ML injection    insulin glargine 100 UNIT/ML injection                Protect others around you: Learn how to safely use, store and throw away your medicines at www.disposemymeds.org.             Medication List: This is a list of all your medications and when to take them. Check marks below indicate your daily home schedule. Keep this list as a reference.      Medications           Morning Afternoon Evening Bedtime As Needed    ACETAMINOPHEN PO   Last time this was given:  975 mg on 3/4/2017  6:02 AM                                albuterol 108 (90 BASE) MCG/ACT Inhaler   Commonly known as:  PROAIR HFA/PROVENTIL HFA/VENTOLIN  HFA   Inhale 2 puffs into the lungs every 6 hours                                ALDACTONE PO   Take 25 mg by mouth 2 times daily   Last time this was given:  25 mg on 3/4/2017  9:30 AM                                ammonium lactate 12 % lotion   Commonly known as:  LAC-HYDRIN   Apply topically 2 times daily                                ASPIRIN PO   Take 325 mg by mouth daily   Last time this was given:  325 mg on 3/4/2017  9:31 AM                                ATORVASTATIN CALCIUM PO   Take 40 mg by mouth daily   Last time this was given:  40 mg on 3/4/2017  9:30 AM                                bimatoprost 0.01 % Soln   Commonly known as:  LUMIGAN   1 drop At Bedtime   Last time this was given:  1 drop on 3/3/2017 11:30 PM                                carboxymethylcellulose 0.5 % Soln ophthalmic solution   Commonly known as:  REFRESH PLUS   1 drop 3 times daily as needed for dry eyes                                cetirizine 10 MG tablet   Commonly known as:  zyrTEC   Take 10 mg by mouth daily                                cholestyramine 4 G Packet   Commonly known as:  QUESTRAN   Take 1 packet by mouth 3 times daily (with meals)                                COREG PO   Take 6.25 mg by mouth daily   Last time this was given:  6.25 mg on 3/4/2017  9:30 AM                                cycloSPORINE 0.05 % ophthalmic emulsion   Commonly known as:  RESTASIS   1 drop 2 times daily   Last time this was given:  1 drop on 3/4/2017  9:25 AM                                hydrochlorothiazide 12.5 MG capsule   Commonly known as:  MICROZIDE   Take 12.5 mg by mouth daily   Last time this was given:  12.5 mg on 3/2/2017  8:35 AM                                HYDROmorphone 2 MG tablet   Commonly known as:  DILAUDID   Take 0.5 tablets (1 mg) by mouth every 3 hours as needed for moderate to severe pain   Last time this was given:  1 mg on 3/4/2017  6:02 AM                                * insulin aspart 100 UNIT/ML  injection   Commonly known as:  NovoLOG PEN   Inject 1-10 Units Subcutaneous 3 times daily (with meals) HIGH INSULIN RESISTANCE DOSING    Do Not give if Pre-Meal BG < 140.  140 - 164 give 1 unit.   165 - 189 give 2 units.   190 - 214 give 3 units.   215 - 239 give 4 units.  240 - 264 give 5 units.   265 - 289 give 6 units.   290 - 314 give 7 units.  315 - 339 give 8 units.  340 - 364 give 9 units. = or > 365 give 10 units   Last time this was given:  12 Units on 3/4/2017  9:27 AM                                * insulin aspart 100 UNIT/ML injection   Commonly known as:  NovoLOG PEN   Inject 1-7 Units Subcutaneous At Bedtime For  - 224 give 1 units.  For  - 249 give 2 units.  For  - 274 give 3 units.  For  - 299 give 4 units.  For  - 324 give 5 units.  For  - 349 give 6 units.  For BG greater than or equal to 350 give 7 units.   Last time this was given:  12 Units on 3/4/2017  9:27 AM                                * insulin aspart 100 UNIT/ML injection   Commonly known as:  NovoLOG PEN   DOSE:  1 units per 10 grams of carbohydrate TID with meals and snacks    Only chart total amount of units given.  Do not give if pre-prandial glucose is less than 60 mg/dL.   Last time this was given:  12 Units on 3/4/2017  9:27 AM                                insulin glargine 100 UNIT/ML injection   Commonly known as:  LANTUS SOLOSTAR   Inject 25 Units Subcutaneous 2 times daily 25 units evening   Last time this was given:  25 Units on 3/4/2017  9:23 AM                                LISINOPRIL PO   Take 20 mg by mouth daily   Last time this was given:  20 mg on 3/4/2017  9:30 AM                                METFORMIN HCL PO   Take 1,000 mg by mouth 2 times daily (with meals)   Last time this was given:  1,000 mg on 3/4/2017  9:30 AM                                mometasone 50 MCG/ACT spray   Commonly known as:  NASONEX   Spray 2 sprays into both nostrils daily                                 RANITIDINE HCL PO   Take 150 mg by mouth 2 times daily                                senna-docusate 8.6-50 MG per tablet   Commonly known as:  SENOKOT-S;PERICOLACE   Take 2 tablets by mouth 2 times daily   Last time this was given:  2 tablets on 3/4/2017 12:41 AM                                vitamin D 86470 UNIT capsule   Commonly known as:  ERGOCALCIFEROL   Take 50,000 Units by mouth                                VITAMIN D3 PO   Take 1,000 Units by mouth                                ZETIA PO   Take 10 mg by mouth                                * Notice:  This list has 3 medication(s) that are the same as other medications prescribed for you. Read the directions carefully, and ask your doctor or other care provider to review them with you.

## 2017-02-27 NOTE — ANESTHESIA POSTPROCEDURE EVALUATION
Patient: Prince Agarwal    Procedure(s):  endoscopic retrograde cholangiopancreatogram Pancreatic Duct dilation and stent placement - Wound Class: II-Clean Contaminated    Diagnosis:Chronic Pancreatitis   Diagnosis Additional Information: No value filed.    Anesthesia Type:  General, ETT    Note:  Anesthesia Post Evaluation    Patient location during evaluation: PACU  Patient participation: Able to fully participate in evaluation  Level of consciousness: awake  Pain management: satisfactory to patient  Airway patency: patent  Cardiovascular status: acceptable  Respiratory status: acceptable  Hydration status: acceptable  PONV: none     Anesthetic complications: None          Last vitals:  Vitals:    02/27/17 1159   BP: 141/80   Resp: 16   Temp: 36.4  C (97.5  F)   SpO2: 97%         Electronically Signed By: Vernon Amin MD  February 27, 2017  5:24 PM

## 2017-02-27 NOTE — IP AVS SNAPSHOT
[address] [address]    Phone:  [phone]                                       After Visit Summary   2/27/2017    Prince Agarwal    MRN: [id number]           After Visit Summary Signature Page     I have received my discharge instructions, and my questions have been answered. I have discussed any challenges I see with this plan with the nurse or doctor.    ..........................................................................................................................................  Patient/Patient Representative Signature      ..........................................................................................................................................  Patient Representative Print Name and Relationship to Patient    ..................................................               ................................................  Date                                            Time    ..........................................................................................................................................  Reviewed by Signature/Title    ...................................................              ..............................................  Date                                                            Time

## 2017-02-27 NOTE — ANESTHESIA CARE TRANSFER NOTE
Patient: Prince Agarwal    Procedure(s):  endoscopic retrograde cholangiopancreatogram Pancreatic Duct dilation and stent placement - Wound Class: II-Clean Contaminated    Diagnosis: Chronic Pancreatitis   Diagnosis Additional Information: No value filed.    Anesthesia Type:   General, ETT     Note:  Airway :Face Mask  Patient transferred to:PACU  Comments: To  PACU awakening and veentilating welol color pink  VSS  IV infusing well report to nurse   Jose Manuel Roca CRNA      Vitals: (Last set prior to Anesthesia Care Transfer)    CRNA VITALS  2/27/2017 1638 - 2/27/2017 1717      2/27/2017             Resp Rate (set): 10                Electronically Signed By: ARIE GASTELUM CRNA  February 27, 2017  5:17 PM

## 2017-02-27 NOTE — OR NURSING
Dr. Mendosa at bedside.  Updated that EKG image not available, but number results are, per MDA this is okay.  Also updated on BG of 252, no new orders received.

## 2017-02-27 NOTE — BRIEF OP NOTE
[facility], [place]  Gastroenterology Brief Operative Note    Pre-operative diagnosis: Chronic pancreatitis, PD Stricture   Post-operative diagnosis Same   Procedure: ERCP   Surgeon: Reuben Heredia MD   Assistants(s): Chandrika Kelly MD   Anesthesia: General endotracheal anesthesia   Estimated blood loss: None    Total IV fluids: (See anesthesia record)   Blood transfusion: No transfusion was given during surgery   Total urine output: (See anesthesia record)   Drains: None   Specimens: None   Implants: Advantix 3FR x 13 cm SPT   Findings: - PD cannulated, tight stenosis noted at ampulla: passively dilated with sphincterotome.   - Cholangio showed severe stricture at the body with upstream dilation.   - Dilated with 3 cm angiocath. Unable to pass 4 mm hurricane or 5FR Sohendra screw.  - Concern for wire induced ductal disruption.  - Advantix stent placed.    Complications: None   Condition: Stable   Comments:      Recommendations:         See dictated procedure report for full details (found in chart review under 'Procedures')    - Observe in Same Day for possible discharge  - Check amylase and lipase 2 hours post-procedure  - Discharge home if minimal or no pain and no significant elevation in pancreatic enzymes otherwise will admit him.   - Repeat ERCP in 2 weeks.      Chandrika Kelly MD  [phone]

## 2017-02-28 LAB
AMYLASE SERPL-CCNC: 249 U/L (ref 30–110)
ERYTHROCYTE [DISTWIDTH] IN BLOOD BY AUTOMATED COUNT: 12.8 % (ref 10–15)
GLUCOSE BLDC GLUCOMTR-MCNC: 172 MG/DL (ref 70–99)
GLUCOSE BLDC GLUCOMTR-MCNC: 193 MG/DL (ref 70–99)
GLUCOSE BLDC GLUCOMTR-MCNC: 195 MG/DL (ref 70–99)
GLUCOSE BLDC GLUCOMTR-MCNC: 204 MG/DL (ref 70–99)
GLUCOSE BLDC GLUCOMTR-MCNC: 210 MG/DL (ref 70–99)
HBA1C MFR BLD: 8.8 % (ref 4.3–6)
HCT VFR BLD AUTO: 33.5 % (ref 40–53)
HGB BLD-MCNC: 11.5 G/DL (ref 13.3–17.7)
LIPASE SERPL-CCNC: 3585 U/L (ref 73–393)
MCH RBC QN AUTO: 29.3 PG (ref 26.5–33)
MCHC RBC AUTO-ENTMCNC: 34.3 G/DL (ref 31.5–36.5)
MCV RBC AUTO: 86 FL (ref 78–100)
PLATELET # BLD AUTO: 169 10E9/L (ref 150–450)
RBC # BLD AUTO: 3.92 10E12/L (ref 4.4–5.9)
WBC # BLD AUTO: 9 10E9/L (ref 4–11)

## 2017-02-28 PROCEDURE — 25800025 ZZH RX 258: Performed by: NURSE PRACTITIONER

## 2017-02-28 PROCEDURE — 83690 ASSAY OF LIPASE: CPT | Performed by: NURSE PRACTITIONER

## 2017-02-28 PROCEDURE — A9270 NON-COVERED ITEM OR SERVICE: HCPCS | Mod: GY | Performed by: NURSE PRACTITIONER

## 2017-02-28 PROCEDURE — 25000131 ZZH RX MED GY IP 250 OP 636 PS 637: Mod: GY | Performed by: INTERNAL MEDICINE

## 2017-02-28 PROCEDURE — 83036 HEMOGLOBIN GLYCOSYLATED A1C: CPT | Performed by: NURSE PRACTITIONER

## 2017-02-28 PROCEDURE — 00000146 ZZHCL STATISTIC GLUCOSE BY METER IP

## 2017-02-28 PROCEDURE — 82150 ASSAY OF AMYLASE: CPT | Performed by: NURSE PRACTITIONER

## 2017-02-28 PROCEDURE — 85027 COMPLETE CBC AUTOMATED: CPT | Performed by: NURSE PRACTITIONER

## 2017-02-28 PROCEDURE — 25000131 ZZH RX MED GY IP 250 OP 636 PS 637: Mod: GY | Performed by: NURSE PRACTITIONER

## 2017-02-28 PROCEDURE — 25000132 ZZH RX MED GY IP 250 OP 250 PS 637: Mod: GY | Performed by: INTERNAL MEDICINE

## 2017-02-28 PROCEDURE — 12000001 ZZH R&B MED SURG/OB UMMC

## 2017-02-28 PROCEDURE — 99232 SBSQ HOSP IP/OBS MODERATE 35: CPT | Performed by: INTERNAL MEDICINE

## 2017-02-28 PROCEDURE — 25000128 H RX IP 250 OP 636: Performed by: INTERNAL MEDICINE

## 2017-02-28 PROCEDURE — A9270 NON-COVERED ITEM OR SERVICE: HCPCS | Mod: GY | Performed by: INTERNAL MEDICINE

## 2017-02-28 PROCEDURE — 25000128 H RX IP 250 OP 636: Performed by: NURSE PRACTITIONER

## 2017-02-28 PROCEDURE — 36415 COLL VENOUS BLD VENIPUNCTURE: CPT | Performed by: NURSE PRACTITIONER

## 2017-02-28 PROCEDURE — 25000132 ZZH RX MED GY IP 250 OP 250 PS 637: Mod: GY | Performed by: NURSE PRACTITIONER

## 2017-02-28 RX ORDER — HYDROMORPHONE HYDROCHLORIDE 1 MG/ML
.3-.5 INJECTION, SOLUTION INTRAMUSCULAR; INTRAVENOUS; SUBCUTANEOUS
Status: DISCONTINUED | OUTPATIENT
Start: 2017-02-28 | End: 2017-03-04 | Stop reason: HOSPADM

## 2017-02-28 RX ORDER — OXYCODONE HYDROCHLORIDE 5 MG/1
5-10 TABLET ORAL EVERY 4 HOURS PRN
Status: DISCONTINUED | OUTPATIENT
Start: 2017-02-28 | End: 2017-03-03

## 2017-02-28 RX ADMIN — OXYCODONE HYDROCHLORIDE 10 MG: 5 TABLET ORAL at 18:32

## 2017-02-28 RX ADMIN — INSULIN GLARGINE 25 UNITS: 100 INJECTION, SOLUTION SUBCUTANEOUS at 21:21

## 2017-02-28 RX ADMIN — CYCLOSPORINE 1 DROP: 0.5 EMULSION OPHTHALMIC at 21:21

## 2017-02-28 RX ADMIN — CARVEDILOL 6.25 MG: 6.25 TABLET, FILM COATED ORAL at 08:28

## 2017-02-28 RX ADMIN — SPIRONOLACTONE 25 MG: 25 TABLET ORAL at 08:28

## 2017-02-28 RX ADMIN — OXYCODONE HYDROCHLORIDE 5 MG: 5 TABLET ORAL at 13:55

## 2017-02-28 RX ADMIN — LISINOPRIL 20 MG: 20 TABLET ORAL at 08:27

## 2017-02-28 RX ADMIN — ATORVASTATIN CALCIUM 40 MG: 40 TABLET, FILM COATED ORAL at 08:28

## 2017-02-28 RX ADMIN — SODIUM CHLORIDE, POTASSIUM CHLORIDE, SODIUM LACTATE AND CALCIUM CHLORIDE: 600; 310; 30; 20 INJECTION, SOLUTION INTRAVENOUS at 08:24

## 2017-02-28 RX ADMIN — CYCLOSPORINE 1 DROP: 0.5 EMULSION OPHTHALMIC at 08:29

## 2017-02-28 RX ADMIN — HYDROMORPHONE HYDROCHLORIDE 0.5 MG: 10 INJECTION, SOLUTION INTRAMUSCULAR; INTRAVENOUS; SUBCUTANEOUS at 10:14

## 2017-02-28 RX ADMIN — CYCLOSPORINE 1 DROP: 0.5 EMULSION OPHTHALMIC at 00:28

## 2017-02-28 RX ADMIN — HYDROMORPHONE HYDROCHLORIDE 0.5 MG: 10 INJECTION, SOLUTION INTRAMUSCULAR; INTRAVENOUS; SUBCUTANEOUS at 01:08

## 2017-02-28 RX ADMIN — HYDROCHLOROTHIAZIDE 12.5 MG: 12.5 CAPSULE ORAL at 08:28

## 2017-02-28 RX ADMIN — BIMATOPROST 1 DROP: 0.1 SOLUTION/ DROPS OPHTHALMIC at 21:20

## 2017-02-28 RX ADMIN — INSULIN ASPART 1 UNITS: 100 INJECTION, SOLUTION INTRAVENOUS; SUBCUTANEOUS at 21:21

## 2017-02-28 RX ADMIN — INSULIN GLARGINE 25 UNITS: 100 INJECTION, SOLUTION SUBCUTANEOUS at 01:00

## 2017-02-28 RX ADMIN — SPIRONOLACTONE 25 MG: 25 TABLET ORAL at 21:20

## 2017-02-28 RX ADMIN — HYDROMORPHONE HYDROCHLORIDE 0.5 MG: 10 INJECTION, SOLUTION INTRAMUSCULAR; INTRAVENOUS; SUBCUTANEOUS at 15:48

## 2017-02-28 RX ADMIN — SODIUM CHLORIDE, POTASSIUM CHLORIDE, SODIUM LACTATE AND CALCIUM CHLORIDE: 600; 310; 30; 20 INJECTION, SOLUTION INTRAVENOUS at 21:37

## 2017-02-28 RX ADMIN — ACETAMINOPHEN 975 MG: 325 TABLET, FILM COATED ORAL at 21:37

## 2017-02-28 ASSESSMENT — PAIN DESCRIPTION - DESCRIPTORS
DESCRIPTORS: ACHING
DESCRIPTORS: DISCOMFORT
DESCRIPTORS: ACHING
DESCRIPTORS: ACHING
DESCRIPTORS: ACHING;CONSTANT;SHARP

## 2017-02-28 NOTE — OR NURSING
was notified about patients Blood sugar 251. He would prefer to wait for now. Dr. Heredia was here also. Patient is to get IV fluids 200/hr and is to stay in PACU until after labs are back. Report to Diya To RN

## 2017-02-28 NOTE — OR NURSING
Patient admitted to 6D. Medicated for pain and elevated BP. Unable to tolerate sips of water without pain, denies nausea. Blood sugar is now 220 which is where patient states he usually is at. No urge to void  Bladder scanned for 285cc

## 2017-02-28 NOTE — H&P
[facility] History and Physical  Department of Internal Medicine    Patient Name: Prince Agarwal MRN# [id number]   Age: 61 year old YOB: 1955     Date of Admission:2/27/2017    Primary care provider: Brenda Mejia  Date of Service: 2/27/2017  Admitting Team: [facility]         Assessment and Plan:   Prince Agarwal is a 61 year old male with a history of chronic pancreatitis, COLLIN, CAD with stenting, hypertension, CKD and DM II who presented to the hospital today for an ERCP.    1) Post ERCP pancreatitis - Patient with chronic pancreatitis now s/p PD stenosis dilation and stent placement with concern for wire ductal disruption.  Post ERCP he had significant worsening of his abdominal pain, inability to tolerate PO and elevation of his amylase and lipase.  - LR @ 150 ccs/hr overnight.  - Amylase, lipase in the am  - Patient extremely leery of pain medication.  Will order low dose IV dilaudid  - Clear liquids, ADAT.    2) DM II - On a complex regimen involving Lantus, 70/30, sliding scale and metformin.  - Patient counseled to discuss endocrinology consultation with his PCP as his regimen is unusually complex  - Will hold 70/30 given unclear intake tomorrow  - Will give HS lantus and order sliding scale insulin.  May need to titrate doses upwards.    3) CAD with stenting  - Holding ASA given ERCP    4) Hypertension  - Continue home coreg, HCTZ, lisinopril, spironolactone    CODE: Full  Diet/IVF: Clears  DVT ppx: Mechanical  Disposition/Admission Status: Inpatient    Elia Stern  Internal Medicine Hospitalist & [facility]  Pager: [phone]           Chief Complaint:   Abdominal pain post ERCP         HPI:   Prince Agarwal is a 61 year old male with a history of chronic pancreatitis, COLLIN, CAD with stenting, hypertension, CKD and DM II who presented to the hospital today for an ERCP.    The patient reports he had a cold a week ago but otherwise denies any  recent illness.  Following his ERCP today he felt better initially but then developed severe LUQ pain that radiated up and down his abdomen.  He denies any chest pain, shortness of breath, nausea, vomiting, diarrhea or constipation.  No fevers or chills.         Past Medical History:     Past Medical History   Diagnosis Date     Chronic pancreatitis (H)      Coronary artery disease      Diabetes (H)      type 2     Glaucoma      Heart attack (H)      Hypertension      Sleep apnea      undetermined       PMH reviewed and up to date         Past Surgical History:     Past Surgical History   Procedure Laterality Date     Stent, coronary, marco       x 1     Back surgery       L4-5 fusion     Shoulder surgery       left and right     Carpal tunnel release rt/lt       right and left     Upper gi endoscopy         PSH reviewed and up to date         Social History:     Social History     Social History     Marital status:      Spouse name: N/A     Number of children: N/A     Years of education: N/A     Occupational History     Not on file.     Social History Main Topics     Smoking status: Never Smoker     Smokeless tobacco: Not on file     Alcohol use No     Drug use: No     Sexual activity: Not on file     Other Topics Concern     Not on file     Social History Narrative            Family History:     Family History   Problem Relation Age of Onset     HEART DISEASE Mother      Chronic Obstructive Pulmonary Disease Father             Immunizations:     There is no immunization history on file for this patient.           Allergies:      Allergies   Allergen Reactions     Creon [Pancrelipase]      Increased heart rate     Nexium [Esomeprazole]      Head ache     Toprol Xl [Metoprolol]             Medications:     Prior to Admission medications    Medication Sig Last Dose Taking? Auth Provider   RIFAXIMIN PO Take 550 mg by mouth 2 times daily 2/27/2017 at 0800 Yes Reported, Patient   Carvedilol (COREG PO) Take 6.25 mg  by mouth daily 2/27/2017 at 0800 Yes Reported, Patient   Spironolactone (ALDACTONE PO) Take 25 mg by mouth 2 times daily 2/27/2017 at 0800 Yes Reported, Patient   METFORMIN HCL PO Take 1,000 mg by mouth 2 times daily (with meals) 2/27/2017 at 0800 Yes Reported, Patient   insulin aspart (INSULIN ASPART) 100 UNIT/ML injection Inject Subcutaneous 3 times daily (with meals) 2/26/2017 at Unknown time Yes Reported, Patient   bimatoprost (LUMIGAN) 0.01 % SOLN 1 drop At Bedtime 2/26/2017 at pm Yes Reported, Patient   RANITIDINE HCL PO Take 150 mg by mouth 2 times daily  2/26/2017 at pm Yes Reported, Patient   hydrochlorothiazide (MICROZIDE) 12.5 MG capsule Take 12.5 mg by mouth daily 2/27/2017 at 0800 Yes Reported, Patient   Ezetimibe (ZETIA PO) Take 10 mg by mouth  2/26/2017 at pm Yes Reported, Patient   vitamin D (ERGOCALCIFEROL) 02982 UNIT capsule Take 50,000 Units by mouth 2/27/2017 at 0800 Yes Reported, Patient   ACETAMINOPHEN PO  Past Week at Unknown time Yes Reported, Patient   mometasone (NASONEX) 50 MCG/ACT spray Spray 2 sprays into both nostrils daily Past Week at Unknown time Yes Reported, Patient   insulin aspart prot & aspart (NOVOLOG MIX 70/30 FLEXPEN) injection Inject Subcutaneous 2 times daily Patient takes R 39 units and N 52 units every AM and R 15units and N 19 units every PM 2/26/2017 at Unknown time Yes Reported, Patient   insulin glargine (LANTUS SOLOSTAR) 100 UNIT/ML injection Inject Subcutaneous At Bedtime 25 units evening 2/26/2017 at pm Yes Reported, Patient   albuterol (PROAIR HFA/PROVENTIL HFA/VENTOLIN HFA) 108 (90 BASE) MCG/ACT Inhaler Inhale 2 puffs into the lungs every 6 hours Past Month at Unknown time Yes Reported, Patient   ASPIRIN PO Take 325 mg by mouth daily  Past Week at Unknown time Yes Reported, Patient   LISINOPRIL PO Take 20 mg by mouth daily  2/26/2017 at am Yes Reported, Patient   ATORVASTATIN CALCIUM PO Take 40 mg by mouth daily  Past Week at Unknown time Yes Reported, Patient  "  Cholecalciferol (VITAMIN D3 PO) Take 1,000 Units by mouth  2/27/2017 at 0800 Yes Reported, Patient   cycloSPORINE (RESTASIS) 0.05 % ophthalmic emulsion 1 drop 2 times daily 2/26/2017 at Unknown time Yes Reported, Patient   cetirizine (ZYRTEC) 10 MG tablet Take 10 mg by mouth daily 2/26/2017 at pm Yes Reported, Patient   carboxymethylcellulose (REFRESH PLUS) 0.5 % SOLN ophthalmic solution 1 drop 3 times daily as needed for dry eyes Past Week at Unknown time Yes Reported, Patient   ammonium lactate (LAC-HYDRIN) 12 % lotion Apply topically 2 times daily 2/26/2017 at pm Yes Reported, Patient   Diclofenac Sodium (VOLTAREN PO) Take 50 mg by mouth 2 times daily as needed  More than a month at Unknown time  Reported, Patient   cholestyramine (QUESTRAN) 4 G Packet Take 1 packet by mouth 3 times daily (with meals) More than a month at Unknown time  Reported, Patient             Review of Systems:     A complete ROS was performed and is negative other than what is stated in the HPI.         Physical Exam:   Blood pressure 155/81, temperature 98.4  F (36.9  C), temperature source Oral, resp. rate 16, height 1.753 m (5' 9\"), weight 117.2 kg (258 lb 6.1 oz), SpO2 97 %.    Physical Exam   Constitutional:   Well nourished, well developed, resting comfortably   Head: Normocephalic and atraumatic.   Eyes: Conjunctivae are normal. Pupils are equal, round, and reactive to light.    Oral: Pharynx has no erythema or exudate, mucous membranes are moist  Neck:   No adenopathy, no bony tenderness  Cardiovascular: Regular rate and rhythm without murmurs or gallops  Pulmonary/Chest: Clear to auscultation bilaterally, with no wheezes or retractions. No respiratory distress.  GI: Soft with good bowel sounds.  Tender in the LUQ, non-distended, with mild guarding, no rebound, no peritoneal signs.   Back:  No bony or CVA tenderness   Musculoskeletal:  No edema or clubbing   Skin: Skin is warm and dry. No rash noted.   Neurological: Alert and " oriented to person, place, and time. Nonfocal exam  Psychiatric:  Normal mood and affect.         Data:   [facility], [place]  Gastroenterology Brief Operative Note     Pre-operative diagnosis: Chronic pancreatitis, PD Stricture   Post-operative diagnosis Same   Procedure: ERCP   Surgeon: Reuben Heredia MD   Assistants(s): Chandrika Kelly MD   Anesthesia: General endotracheal anesthesia   Estimated blood loss: None    Total IV fluids: (See anesthesia record)   Blood transfusion: No transfusion was given during surgery   Total urine output: (See anesthesia record)   Drains: None   Specimens: None   Implants: Advantix 3FR x 13 cm SPT   Findings: - PD cannulated, tight stenosis noted at ampulla: passively dilated with sphincterotome.   - Cholangio showed severe stricture at the body with upstream dilation.   - Dilated with 3 cm angiocath. Unable to pass 4 mm hurricane or 5FR Sohendra screw.  - Concern for wire induced ductal disruption.  - Advantix stent placed.    Complications: None   Condition: Stable   Comments:        Recommendations:          See dictated procedure report for full details (found in chart review under 'Procedures')     - Observe in Same Day for possible discharge  - Check amylase and lipase 2 hours post-procedure  - Discharge home if minimal or no pain and no significant elevation in pancreatic enzymes otherwise will admit him.   - Repeat ERCP in 2 weeks.    Chandrika Kelly MD  [phone]      I spoke with Dr Butts regarding patient's plan of care  I reviewed past notes, imaging and labs      Elia Stern, ACNP          Physician Attestation   I, Luis Butts, saw and evaluated Prince Agarwal as part of a shared visit.  I have reviewed and discussed with the advanced practice provider their history, physical and plan.    I personally reviewed the vital signs, medications, labs and imaging.    My key history or physical exam findings: Post ERCP  Pancreatitis    Key management decisions made by me: NPO, IVF, pain meds.  Reassess in am.  Try clears if improved.    Luis Butts  Date of Service (when I saw the patient): 2/27/17

## 2017-02-28 NOTE — PROGRESS NOTES
"Patient alert and oriented x4. C/O pain to the abdomen and PRN IV dilaudid was given with reported relief. Ambulating to the bathroom with SBA and is voiding without difficulty. Blood sugar at HS was 218, patient refused Novolog stating it was a waste to be poked for just 1 unit. Received scheduled lantus. Patient reports pain is much better this morning. IVF running per orders. Blood pressure 140/73, temperature 98.7  F (37.1  C), temperature source Oral, resp. rate 18, height 1.753 m (5' 9\"), weight 117.2 kg (258 lb 6.1 oz), SpO2 94 %.      "

## 2017-02-28 NOTE — PLAN OF CARE
Problem: Individualization  Goal: Patient Preferences  Outcome: No Change  Pt remains NPO, except meds. IVF continue as ordered. Amylase and lipase remain elevated. Pt c/o abd pain-transitioned to oral pain medication today. Pt ambulating in room and to bathroom, urinating adequate amounts. Friend remains at bedside.

## 2017-02-28 NOTE — PROGRESS NOTES
[facility], [place]   Internal Medicine Daily Note          Assessment and Plan:     Prince Agarwal is a 61 year old male with a history of chronic pancreatitis, COLLIN, CAD with stenting, hypertension, CKD and DM II who presented to the hospital today for an ERCP.     1) Post ERCP Pancreatitis - Patient with chronic pancreatitis now s/p PD stenosis dilation and stent placement with concern for wire ductal disruption.   Post ERCP he had significant worsening of his abdominal pain, inability to tolerate PO and elevation of his amylase and lipase.  - LR @ 150 ccs/hr . Continue NPO for now, but may initiate clear liquids later today   - Amylase, lipase with a slight uptrend this morning, but symptomatically better  - Pain medications as needed. Oxycodone 5-10 mg Q 4 hrs PRN and Dilaudid 0.3 -0.5 mg q 3hrs PRN for breakthrough pain      2) DM II    On a complex regimen involving Lantus, physically mixed long/short acting insulin, sliding scale and metformin.  - Patient counseled to discuss endocrinology consultation with his PCP as his regimen is unusually complex  - Will continue to holed the mix for now   -  HS lantus and sliding scale insulin for now       3) CAD with stenting  -Stent placed in 2009   -Holding ASA given ERCP       4) Hypertension  - Continue home coreg, HCTZ, lisinopril, spironolactone      Consulting teams: GI  Code status: Full   DVT Prophylaxis: PCD's   Gastric prophylaxis: None   Diet: NPO, likely to advance to clears today   Disposition: Likely home in 1-2 days       Patient seen and examined with RN         Interval History:   Progress notes in the last 24 hrs by MDT team has been reviewed.  This is the first time I am meeting with Paresh. His H&P and progress of events has been reviewed.  Did have pain over night, but feeling a little better  Friend present in room  Took his medications orally with sips of water  Over all, abdominal pain getting better  No nausea or  "vomiting   No fever or chills            Review of Systems:   A comprehensive review of systems was performed and found to be negative except: Those that are outlined in interval history              Medications:   I have reviewed this patient's current medications which are outlined in the \"current medication\" section of EPIC             Physical Exam:   Vitals were reviewed  Temp: 98.5  F (36.9  C) Temp src: Oral BP: 130/65   Heart Rate: 92 Resp: 18 SpO2: 94 % O2 Device: None (Room air) Oxygen Delivery: 1 LPM  Constitutional:   awake, alert, cooperative, no apparent distress, and appears stated age     Lungs:   No increased work of breathing, good air exchange, clear to auscultation bilaterally, no crackles or wheezing     Cardiovascular:   Normal apical impulse, regular rate and rhythm, normal S1 and S2, no S3 or S4, and no murmur noted     Abdomen:   No scars, normal bowel sounds, soft, non-distended,  Mild tenderness in the epigastrium  no masses palpated, no hepatosplenomegally     Musculoskeletal:   no lower extremity pitting edema present     Neurologic:   Awake, alert, oriented to name, place and time.  Cranial nerves II-XII are grossly intact.             Data:     Most recent labs have been evaluated and relevant labs are outlined below :    BMP  Recent Labs  Lab 02/27/17  1226      POTASSIUM 4.0   CHLORIDE 100   HERMES 8.9   CO2 27   BUN 22   CR 1.20   *     CBC  Recent Labs  Lab 02/28/17  0745 02/27/17  1226   WBC 9.0 7.7   RBC 3.92* 4.39*   HGB 11.5* 12.8*   HCT 33.5* 36.4*   MCV 86 83   MCH 29.3 29.2   MCHC 34.3 35.2   RDW 12.8 12.6    213     INR  Recent Labs  Lab 02/27/17  1226   INR 1.07     LFTs  Recent Labs  Lab 02/27/17  1226   ALKPHOS 59   AST 15   ALT 47   BILITOTAL 0.6   PROTTOTAL 7.9   ALBUMIN 4.2      PANC  Recent Labs  Lab 02/28/17  0745 02/27/17  1913 02/27/17  1226   LIPASE 3585* 2000* 353   AMYLASE 249* 141* 62       Dr GARRY Mcrae MD  Hospitalist ( Internal " medicine)  Pager: [phone]

## 2017-02-28 NOTE — PROGRESS NOTES
"[facility]  GASTROENTEROLOGY PROGRESS NOTE  Prince Agarwal [id number]   02/28/2017    60 yo M patient with h/o chronic pancreatitis with MRCP findings of PD stricture with upstream duct dilation underwent ERCP.  The procedure was prolonged owing to high grade body stricture which prevented passage of most dilators. Multitude of guidewires was used and there was a concern for possible duct injury. However the duct was subsequently stented. Post op admitted for pain.   - Overnight had severe pain with sips of water.   - Required iv dilaudid.   - Burping a lot.   - C/o moderate amount of pain.     OBJECTIVE:  VS: /73 (BP Location: Right arm)  Temp 98.7  F (37.1  C) (Oral)  Resp 18  Ht 1.753 m (5' 9\")  Wt 117.2 kg (258 lb 6.1 oz)  SpO2 94%  BMI 38.16 kg/m2   GEN: A&Ox3, NAD, comfortable  CV:  RRR, no M/G/R  PULM:  CTA B/L  ABD: Normoactive bowel sounds, soft, ND,  no HSM tender in the epigastric region.       REVIEW OF LABORATORY, PATHOLOGY AND IMAGING RESULTS:  BMP  Recent Labs  Lab 02/27/17  1226      POTASSIUM 4.0   CHLORIDE 100   HERMES 8.9   CO2 27   BUN 22   CR 1.20   *       CBC  Recent Labs  Lab 02/27/17  1226   WBC 7.7   RBC 4.39*   HGB 12.8*   HCT 36.4*   MCV 83   MCH 29.2   MCHC 35.2   RDW 12.6          INR  Recent Labs  Lab 02/27/17  1226   INR 1.07       LFTs  Recent Labs  Lab 02/27/17  1226   ALKPHOS 59   AST 15   ALT 47   BILITOTAL 0.6   PROTTOTAL 7.9   ALBUMIN 4.2        PANC  Recent Labs  Lab 02/27/17  1913 02/27/17  1226   LIPASE 2000* 353   AMYLASE 141* 62       IMPRESSION:  Prince Agarwal is a 61 year old male admitted s/p complex ERCP owing to high grade stricture in the PD for pain control and elevated lipase.     RECOMMENDATIONS:    - Continue aggressive LR infusion will need to be closely monitored as he is fluid pills as well so will need monitoring for fluid overload.   - Pian control.   - Continue sips of water.   - Nause control.   - Will need ERCP in 2 weeks to " evaluate the stricture.     It has been a pleasure to participate in the care of this patient.  Patient was discussed with GI staff, Dr. Heredia.  Please feel free to page with questions.     Chandrika Kelly MD  Therapeutic Endoscopy Fellow  [phone]

## 2017-03-01 LAB
ALBUMIN SERPL-MCNC: 3.2 G/DL (ref 3.4–5)
ALP SERPL-CCNC: 57 U/L (ref 40–150)
ALT SERPL W P-5'-P-CCNC: 46 U/L (ref 0–70)
AMYLASE SERPL-CCNC: 183 U/L (ref 30–110)
ANION GAP SERPL CALCULATED.3IONS-SCNC: 8 MMOL/L (ref 3–14)
AST SERPL W P-5'-P-CCNC: 22 U/L (ref 0–45)
BILIRUB SERPL-MCNC: 2.6 MG/DL (ref 0.2–1.3)
BUN SERPL-MCNC: 14 MG/DL (ref 7–30)
CALCIUM SERPL-MCNC: 8.6 MG/DL (ref 8.5–10.1)
CHLORIDE SERPL-SCNC: 102 MMOL/L (ref 94–109)
CO2 SERPL-SCNC: 27 MMOL/L (ref 20–32)
CREAT SERPL-MCNC: 1.13 MG/DL (ref 0.66–1.25)
GFR SERPL CREATININE-BSD FRML MDRD: 66 ML/MIN/1.7M2
GLUCOSE BLDC GLUCOMTR-MCNC: 184 MG/DL (ref 70–99)
GLUCOSE BLDC GLUCOMTR-MCNC: 187 MG/DL (ref 70–99)
GLUCOSE BLDC GLUCOMTR-MCNC: 210 MG/DL (ref 70–99)
GLUCOSE BLDC GLUCOMTR-MCNC: 219 MG/DL (ref 70–99)
GLUCOSE BLDC GLUCOMTR-MCNC: 222 MG/DL (ref 70–99)
GLUCOSE SERPL-MCNC: 224 MG/DL (ref 70–99)
LIPASE SERPL-CCNC: 1232 U/L (ref 73–393)
POTASSIUM SERPL-SCNC: 4 MMOL/L (ref 3.4–5.3)
PROT SERPL-MCNC: 6.7 G/DL (ref 6.8–8.8)
SODIUM SERPL-SCNC: 136 MMOL/L (ref 133–144)

## 2017-03-01 PROCEDURE — A9270 NON-COVERED ITEM OR SERVICE: HCPCS | Mod: GY | Performed by: NURSE PRACTITIONER

## 2017-03-01 PROCEDURE — 82150 ASSAY OF AMYLASE: CPT | Performed by: INTERNAL MEDICINE

## 2017-03-01 PROCEDURE — 25000128 H RX IP 250 OP 636: Performed by: INTERNAL MEDICINE

## 2017-03-01 PROCEDURE — 80053 COMPREHEN METABOLIC PANEL: CPT | Performed by: INTERNAL MEDICINE

## 2017-03-01 PROCEDURE — 87040 BLOOD CULTURE FOR BACTERIA: CPT | Performed by: NURSE PRACTITIONER

## 2017-03-01 PROCEDURE — 36415 COLL VENOUS BLD VENIPUNCTURE: CPT | Performed by: NURSE PRACTITIONER

## 2017-03-01 PROCEDURE — 25800025 ZZH RX 258: Performed by: NURSE PRACTITIONER

## 2017-03-01 PROCEDURE — 12000001 ZZH R&B MED SURG/OB UMMC

## 2017-03-01 PROCEDURE — 83690 ASSAY OF LIPASE: CPT | Performed by: INTERNAL MEDICINE

## 2017-03-01 PROCEDURE — 25800025 ZZH RX 258: Performed by: INTERNAL MEDICINE

## 2017-03-01 PROCEDURE — 36415 COLL VENOUS BLD VENIPUNCTURE: CPT | Performed by: INTERNAL MEDICINE

## 2017-03-01 PROCEDURE — 25000132 ZZH RX MED GY IP 250 OP 250 PS 637: Mod: GY | Performed by: NURSE PRACTITIONER

## 2017-03-01 PROCEDURE — 00000146 ZZHCL STATISTIC GLUCOSE BY METER IP

## 2017-03-01 PROCEDURE — 99232 SBSQ HOSP IP/OBS MODERATE 35: CPT | Performed by: INTERNAL MEDICINE

## 2017-03-01 PROCEDURE — A9270 NON-COVERED ITEM OR SERVICE: HCPCS | Mod: GY | Performed by: INTERNAL MEDICINE

## 2017-03-01 PROCEDURE — 25000132 ZZH RX MED GY IP 250 OP 250 PS 637: Mod: GY | Performed by: INTERNAL MEDICINE

## 2017-03-01 RX ADMIN — CARVEDILOL 6.25 MG: 6.25 TABLET, FILM COATED ORAL at 08:00

## 2017-03-01 RX ADMIN — SODIUM CHLORIDE, POTASSIUM CHLORIDE, SODIUM LACTATE AND CALCIUM CHLORIDE: 600; 310; 30; 20 INJECTION, SOLUTION INTRAVENOUS at 05:04

## 2017-03-01 RX ADMIN — ATORVASTATIN CALCIUM 40 MG: 40 TABLET, FILM COATED ORAL at 08:00

## 2017-03-01 RX ADMIN — BIMATOPROST 1 DROP: 0.1 SOLUTION/ DROPS OPHTHALMIC at 21:52

## 2017-03-01 RX ADMIN — SPIRONOLACTONE 25 MG: 25 TABLET ORAL at 07:59

## 2017-03-01 RX ADMIN — OXYCODONE HYDROCHLORIDE 5 MG: 5 TABLET ORAL at 22:38

## 2017-03-01 RX ADMIN — OXYCODONE HYDROCHLORIDE 10 MG: 5 TABLET ORAL at 13:00

## 2017-03-01 RX ADMIN — SPIRONOLACTONE 25 MG: 25 TABLET ORAL at 19:43

## 2017-03-01 RX ADMIN — HYDROMORPHONE HYDROCHLORIDE 0.5 MG: 10 INJECTION, SOLUTION INTRAMUSCULAR; INTRAVENOUS; SUBCUTANEOUS at 03:43

## 2017-03-01 RX ADMIN — ACETAMINOPHEN 975 MG: 325 TABLET, FILM COATED ORAL at 19:00

## 2017-03-01 RX ADMIN — SODIUM CHLORIDE, POTASSIUM CHLORIDE, SODIUM LACTATE AND CALCIUM CHLORIDE: 600; 310; 30; 20 INJECTION, SOLUTION INTRAVENOUS at 21:18

## 2017-03-01 RX ADMIN — HYDROMORPHONE HYDROCHLORIDE 0.5 MG: 10 INJECTION, SOLUTION INTRAMUSCULAR; INTRAVENOUS; SUBCUTANEOUS at 10:13

## 2017-03-01 RX ADMIN — ACETAMINOPHEN 975 MG: 325 TABLET, FILM COATED ORAL at 06:29

## 2017-03-01 RX ADMIN — HYDROCHLOROTHIAZIDE 12.5 MG: 12.5 CAPSULE ORAL at 08:00

## 2017-03-01 RX ADMIN — CYCLOSPORINE 1 DROP: 0.5 EMULSION OPHTHALMIC at 07:57

## 2017-03-01 RX ADMIN — LISINOPRIL 20 MG: 20 TABLET ORAL at 08:00

## 2017-03-01 RX ADMIN — CYCLOSPORINE 1 DROP: 0.5 EMULSION OPHTHALMIC at 19:44

## 2017-03-01 ASSESSMENT — PAIN DESCRIPTION - DESCRIPTORS: DESCRIPTORS: DISCOMFORT

## 2017-03-01 NOTE — PROGRESS NOTES
Pt remains NPO, except meds. IVF continue as ordered. Amylase and lipase remain elevated. Pt c/o abd pain-transitioned to oral pain medication today but was not controlling pain so given IV dilaudid.   Pt ambulating in room and to bathroom, urinating adequate amounts.

## 2017-03-01 NOTE — PROGRESS NOTES
"[facility]  GASTROENTEROLOGY PROGRESS NOTE  Prince Agarwal [id number]   03/01/2017      - Pain better than yesterday.  - Able to tolerate sips of water without nausea/ vomiting.   - No Fevers or chills.     OBJECTIVE:  VS: /72 (BP Location: Right arm)  Pulse 86  Temp 98.6  F (37  C) (Oral)  Resp 16  Ht 1.753 m (5' 9\")  Wt 117.2 kg (258 lb 6.1 oz)  SpO2 91%  BMI 38.16 kg/m2   GEN: A&Ox3, NAD, comfortable  CV:  RRR, no M/G/R  PULM:  CTA B/L  ABD: Normoactive bowel sounds, soft, ND, NT, no HSM      REVIEW OF LABORATORY, PATHOLOGY AND IMAGING RESULTS:  BMP  Recent Labs  Lab 03/01/17  0813 02/27/17  1226    134   POTASSIUM 4.0 4.0   CHLORIDE 102 100   HERMES 8.6 8.9   CO2 27 27   BUN 14 22   CR 1.13 1.20   * 235*       CBC  Recent Labs  Lab 02/28/17  0745 02/27/17  1226   WBC 9.0 7.7   RBC 3.92* 4.39*   HGB 11.5* 12.8*   HCT 33.5* 36.4*   MCV 86 83   MCH 29.3 29.2   MCHC 34.3 35.2   RDW 12.8 12.6    213       INR  Recent Labs  Lab 02/27/17  1226   INR 1.07       LFTs  Recent Labs  Lab 03/01/17  0813 02/27/17  1226   ALKPHOS 57 59   AST 22 15   ALT 46 47   BILITOTAL 2.6* 0.6   PROTTOTAL 6.7* 7.9   ALBUMIN 3.2* 4.2        PANC  Recent Labs  Lab 03/01/17  0813 02/28/17  0745 02/27/17  1913 02/27/17  1226   LIPASE 1232* 3585* 2000* 353   AMYLASE 183* 249* 141* 62       IMPRESSION:  Prince Agarwal is a 61 year old male admitted after a complex ERCP done for high grade stricture in the PD, with pain and elevated lipase. Currently pain improving and lipase is down trending.     RECOMMENDATIONS:  - Continue aggressive LR infusion will need to be closely monitored as he is fluid pills as well so will need monitoring for fluid overload. Once patient starts tolerating clears can reduce the fluids.   - Pain and nausea control.   - Advance diet to clears.  - Rest of the care as per the primary team.   - Will need ERCP in 2 weeks to evaluate the stricture.     It has been a pleasure to participate in the " care of this patient.  Patient was discussed with GI staff, Dr. Heredia  Please feel free to page with questions.     Chandrika Kelly MD  Therapeutic Endoscopy Fellow  [phone]

## 2017-03-01 NOTE — PROGRESS NOTES
[facility], [place]   Internal Medicine Daily Note          Assessment and Plan:     Prince Agarwal is a 61 year old male with a history of chronic pancreatitis, COLLNI, CAD with stenting, hypertension, CKD and DM II who presented to the hospital today for an ERCP.     1) Post ERCP Pancreatitis - Patient with chronic pancreatitis now s/p PD stenosis dilation and stent placement with concern for wire ductal disruption.   Post ERCP he had significant worsening of his abdominal pain, inability to tolerate PO and elevation of his amylase and lipase.  - LR reduced to 100 ccs/hr .  Initiate clear liquids today, given the moderate improvement in pain   - Amylase, lipase trending down. Amylase at 183 and Lipase at 1232 on 3/1  - Pain medications as needed. Oxycodone 5-10 mg Q 4 hrs PRN and Dilaudid 0.3 -0.5 mg q 3hrs PRN for breakthrough pain      2) DM II . Uncontrolled ( A1C at 8.8)   On a complex regimen involving Lantus, physically mixed long/short acting insulin, sliding scale and metformin.  - Patient counseled to discuss endocrinology consultation with his PCP as his regimen is unusually complex  - Will continue to hold the mix for now   -  HS lantus  To resume at 25 units ( May consider increasing the dose depending on diet advancement). SSI changed to high intensity  Also will add carb correction for 1 unit for 10 gms of carbs.  - Advised patient that he will have to establish with an endocrinologist once he goes back to [place] to simplify his rather complex regimen                                                                                  3) CAD with stenting  -Stent placed in 2009   -Holding ASA given ERCP  Will likley resume this on 3/2        4) Hypertension  - Continue home coreg, HCTZ, lisinopril, spironolactone      Consulting teams: GI  Code status: Full   DVT Prophylaxis: PCD's   Gastric prophylaxis: None   Diet: NPO, likely to advance to clears today   Disposition: Likely home  "in 1-2 days       Patient seen and examined with RN         Interval History:   Progress notes in the last 24 hrs by MDT team has been reviewed.  Prince feels slightly better today. In that his pain has reduced   Denies fevers or chills   No nausea  Ea         Review of Systems:   A comprehensive review of systems was performed and found to be negative except: Those that are outlined in interval history              Medications:   I have reviewed this patient's current medications which are outlined in the \"current medication\" section of EPIC             Physical Exam:   Vitals were reviewed  Temp: 98.6  F (37  C) Temp src: Oral BP: 153/72 Pulse: 86 Heart Rate: 94 Resp: 16 SpO2: 91 % O2 Device: None (Room air)    Constitutional:   awake, alert, cooperative, no apparent distress, and appears stated age     Lungs:   No increased work of breathing, good air exchange, clear to auscultation bilaterally, no crackles or wheezing     Cardiovascular:   Normal apical impulse, regular rate and rhythm, normal S1 and S2, no S3 or S4, and no murmur noted     Abdomen:   No scars, normal bowel sounds, soft, non-distended,  No tenderness noted  in the epigastrium  no masses palpated, no hepatosplenomegally     Musculoskeletal:   no lower extremity pitting edema present     Neurologic:   Awake, alert, oriented to name, place and time.  Cranial nerves II-XII are grossly intact.             Data:     Most recent labs have been evaluated and relevant labs are outlined below :    BMP    Recent Labs  Lab 03/01/17  0813 02/27/17  1226    134   POTASSIUM 4.0 4.0   CHLORIDE 102 100   HERMES 8.6 8.9   CO2 27 27   BUN 14 22   CR 1.13 1.20   * 235*     CBC    Recent Labs  Lab 02/28/17  0745 02/27/17  1226   WBC 9.0 7.7   RBC 3.92* 4.39*   HGB 11.5* 12.8*   HCT 33.5* 36.4*   MCV 86 83   MCH 29.3 29.2   MCHC 34.3 35.2   RDW 12.8 12.6    213     INR    Recent Labs  Lab 02/27/17  1226   INR 1.07     LFTs    Recent Labs  Lab " 03/01/17  0813 02/27/17  1226   ALKPHOS 57 59   AST 22 15   ALT 46 47   BILITOTAL 2.6* 0.6   PROTTOTAL 6.7* 7.9   ALBUMIN 3.2* 4.2      PANC    Recent Labs  Lab 03/01/17  0813 02/28/17  0745 02/27/17  1913 02/27/17  1226   LIPASE 1232* 3585* 2000* 353   AMYLASE 183* 249* 141* 62       Dr GARRY Mcrae MD  Hospitalist ( Internal medicine)  Pager: [phone]

## 2017-03-01 NOTE — PROGRESS NOTES
"Patient alert and oriented x4. Remains NPO, except meds. Given dilaudid and tylenol for pain with reported relief. Patient thinks he had a reaction to oxycodone, made him feel weird. IVF continue as ordered. Amylase and lipase remain elevated. Patientt ambulating in room and to bathroom, urinating adequate amounts. Blood pressure 119/53, pulse 86, temperature 99.4  F (37.4  C), temperature source Oral, resp. rate 16, height 1.753 m (5' 9\"), weight 117.2 kg (258 lb 6.1 oz), SpO2 91 %.      "

## 2017-03-02 ENCOUNTER — TELEPHONE (OUTPATIENT)
Dept: GASTROENTEROLOGY | Facility: CLINIC | Age: 62
End: 2017-03-02

## 2017-03-02 ENCOUNTER — APPOINTMENT (OUTPATIENT)
Dept: CT IMAGING | Facility: CLINIC | Age: 62
DRG: 438 | End: 2017-03-02
Attending: PHYSICIAN ASSISTANT
Payer: MEDICARE

## 2017-03-02 ENCOUNTER — CARE COORDINATION (OUTPATIENT)
Dept: GASTROENTEROLOGY | Facility: CLINIC | Age: 62
End: 2017-03-02

## 2017-03-02 DIAGNOSIS — K86.89 PANCREATIC DUCT STRICTURE: Primary | ICD-10-CM

## 2017-03-02 LAB
ANION GAP SERPL CALCULATED.3IONS-SCNC: 6 MMOL/L (ref 3–14)
BUN SERPL-MCNC: 12 MG/DL (ref 7–30)
CALCIUM SERPL-MCNC: 8.6 MG/DL (ref 8.5–10.1)
CHLORIDE SERPL-SCNC: 103 MMOL/L (ref 94–109)
CO2 SERPL-SCNC: 28 MMOL/L (ref 20–32)
CREAT SERPL-MCNC: 0.98 MG/DL (ref 0.66–1.25)
GFR SERPL CREATININE-BSD FRML MDRD: 77 ML/MIN/1.7M2
GLUCOSE BLDC GLUCOMTR-MCNC: 187 MG/DL (ref 70–99)
GLUCOSE BLDC GLUCOMTR-MCNC: 205 MG/DL (ref 70–99)
GLUCOSE BLDC GLUCOMTR-MCNC: 210 MG/DL (ref 70–99)
GLUCOSE BLDC GLUCOMTR-MCNC: 241 MG/DL (ref 70–99)
GLUCOSE SERPL-MCNC: 208 MG/DL (ref 70–99)
LIPASE SERPL-CCNC: 612 U/L (ref 73–393)
POTASSIUM SERPL-SCNC: 3.9 MMOL/L (ref 3.4–5.3)
SODIUM SERPL-SCNC: 137 MMOL/L (ref 133–144)

## 2017-03-02 PROCEDURE — 25800025 ZZH RX 258: Performed by: INTERNAL MEDICINE

## 2017-03-02 PROCEDURE — 25000132 ZZH RX MED GY IP 250 OP 250 PS 637: Mod: GY | Performed by: NURSE PRACTITIONER

## 2017-03-02 PROCEDURE — 25000132 ZZH RX MED GY IP 250 OP 250 PS 637: Mod: GY | Performed by: PHYSICIAN ASSISTANT

## 2017-03-02 PROCEDURE — 36415 COLL VENOUS BLD VENIPUNCTURE: CPT | Performed by: INTERNAL MEDICINE

## 2017-03-02 PROCEDURE — A9270 NON-COVERED ITEM OR SERVICE: HCPCS | Mod: GY | Performed by: PHYSICIAN ASSISTANT

## 2017-03-02 PROCEDURE — 00000146 ZZHCL STATISTIC GLUCOSE BY METER IP

## 2017-03-02 PROCEDURE — 25500064 ZZH RX 255 OP 636: Performed by: RADIOLOGY

## 2017-03-02 PROCEDURE — 25000132 ZZH RX MED GY IP 250 OP 250 PS 637: Mod: GY | Performed by: INTERNAL MEDICINE

## 2017-03-02 PROCEDURE — 99232 SBSQ HOSP IP/OBS MODERATE 35: CPT | Performed by: INTERNAL MEDICINE

## 2017-03-02 PROCEDURE — 12000001 ZZH R&B MED SURG/OB UMMC

## 2017-03-02 PROCEDURE — A9270 NON-COVERED ITEM OR SERVICE: HCPCS | Mod: GY | Performed by: INTERNAL MEDICINE

## 2017-03-02 PROCEDURE — 36415 COLL VENOUS BLD VENIPUNCTURE: CPT | Performed by: PHYSICIAN ASSISTANT

## 2017-03-02 PROCEDURE — 74177 CT ABD & PELVIS W/CONTRAST: CPT

## 2017-03-02 PROCEDURE — 25000128 H RX IP 250 OP 636: Performed by: INTERNAL MEDICINE

## 2017-03-02 PROCEDURE — 83690 ASSAY OF LIPASE: CPT | Performed by: INTERNAL MEDICINE

## 2017-03-02 PROCEDURE — 80048 BASIC METABOLIC PNL TOTAL CA: CPT | Performed by: PHYSICIAN ASSISTANT

## 2017-03-02 PROCEDURE — A9270 NON-COVERED ITEM OR SERVICE: HCPCS | Mod: GY | Performed by: NURSE PRACTITIONER

## 2017-03-02 RX ORDER — DIPHENHYDRAMINE HCL 25 MG
25 CAPSULE ORAL EVERY 6 HOURS PRN
Status: DISCONTINUED | OUTPATIENT
Start: 2017-03-02 | End: 2017-03-03

## 2017-03-02 RX ORDER — IOPAMIDOL 755 MG/ML
100 INJECTION, SOLUTION INTRAVASCULAR ONCE
Status: COMPLETED | OUTPATIENT
Start: 2017-03-02 | End: 2017-03-02

## 2017-03-02 RX ORDER — SIMETHICONE 80 MG
80 TABLET,CHEWABLE ORAL EVERY 6 HOURS PRN
Status: DISCONTINUED | OUTPATIENT
Start: 2017-03-02 | End: 2017-03-04 | Stop reason: HOSPADM

## 2017-03-02 RX ORDER — HYDROCHLOROTHIAZIDE 12.5 MG/1
12.5 CAPSULE ORAL
Status: DISCONTINUED | OUTPATIENT
Start: 2017-03-02 | End: 2017-03-04 | Stop reason: HOSPADM

## 2017-03-02 RX ADMIN — OXYCODONE HYDROCHLORIDE 10 MG: 5 TABLET ORAL at 22:08

## 2017-03-02 RX ADMIN — CARVEDILOL 6.25 MG: 6.25 TABLET, FILM COATED ORAL at 14:06

## 2017-03-02 RX ADMIN — SIMETHICONE CHEW TAB 80 MG 80 MG: 80 TABLET ORAL at 17:40

## 2017-03-02 RX ADMIN — LISINOPRIL 20 MG: 20 TABLET ORAL at 08:35

## 2017-03-02 RX ADMIN — HYDROMORPHONE HYDROCHLORIDE 0.3 MG: 10 INJECTION, SOLUTION INTRAMUSCULAR; INTRAVENOUS; SUBCUTANEOUS at 09:52

## 2017-03-02 RX ADMIN — SODIUM CHLORIDE, POTASSIUM CHLORIDE, SODIUM LACTATE AND CALCIUM CHLORIDE: 600; 310; 30; 20 INJECTION, SOLUTION INTRAVENOUS at 17:53

## 2017-03-02 RX ADMIN — OXYCODONE HYDROCHLORIDE 10 MG: 5 TABLET ORAL at 18:01

## 2017-03-02 RX ADMIN — DIPHENHYDRAMINE HYDROCHLORIDE 25 MG: 25 CAPSULE ORAL at 19:44

## 2017-03-02 RX ADMIN — SPIRONOLACTONE 25 MG: 25 TABLET ORAL at 19:44

## 2017-03-02 RX ADMIN — OXYCODONE HYDROCHLORIDE 5 MG: 5 TABLET ORAL at 03:09

## 2017-03-02 RX ADMIN — ACETAMINOPHEN 975 MG: 325 TABLET, FILM COATED ORAL at 03:04

## 2017-03-02 RX ADMIN — CYCLOSPORINE 1 DROP: 0.5 EMULSION OPHTHALMIC at 08:35

## 2017-03-02 RX ADMIN — CYCLOSPORINE 1 DROP: 0.5 EMULSION OPHTHALMIC at 19:45

## 2017-03-02 RX ADMIN — SODIUM CHLORIDE, POTASSIUM CHLORIDE, SODIUM LACTATE AND CALCIUM CHLORIDE: 600; 310; 30; 20 INJECTION, SOLUTION INTRAVENOUS at 09:48

## 2017-03-02 RX ADMIN — ATORVASTATIN CALCIUM 40 MG: 40 TABLET, FILM COATED ORAL at 08:35

## 2017-03-02 RX ADMIN — SPIRONOLACTONE 25 MG: 25 TABLET ORAL at 08:35

## 2017-03-02 RX ADMIN — HYDROCHLOROTHIAZIDE 12.5 MG: 12.5 CAPSULE ORAL at 08:35

## 2017-03-02 RX ADMIN — IOPAMIDOL 135 ML: 755 INJECTION, SOLUTION INTRAVENOUS at 21:27

## 2017-03-02 RX ADMIN — OXYCODONE HYDROCHLORIDE 5 MG: 5 TABLET ORAL at 09:13

## 2017-03-02 RX ADMIN — BIMATOPROST 1 DROP: 0.1 SOLUTION/ DROPS OPHTHALMIC at 22:07

## 2017-03-02 NOTE — PLAN OF CARE
Problem: Goal Outcome Summary  Goal: Goal Outcome Summary  Outcome: No Change  [id number]: VSS. LR increased to 150 mL/hr. Swelling in bilateral LE unchanged. Diet advanced to full liquid. Pt states warm liquids are tolerated better than cold liquid. Oxy 5mg and 0.3 IV dilaudid given for increasing pain after a few sips of water this AM. Pain improved after dilaudid. Pt tried to eat chicken broth and cream of potato soup-had about 3oz and stopped. Stated he wasn't in pain but more bloating and discomfort/pressure present. Belching frequently. Walk in [place] x2 w/ SBA and had shower this afternoon.  and 217. Continue to monitor and follow POC.

## 2017-03-02 NOTE — PROGRESS NOTES
[facility], [place]   Internal Medicine Daily Note          Assessment and Plan:     Prince Agarwal is a 61 year old male with a history of chronic pancreatitis, COLLIN, CAD with stenting, hypertension, CKD and DM II who presented to the hospital today for an ERCP.     1) Post ERCP Pancreatitis -  Patient with chronic pancreatitis now s/p PD stenosis dilation and stent placement with concern for wire ductal disruption.   Post ERCP he had significant worsening of his abdominal pain, inability to tolerate PO and elevation of his amylase and lipase.  On conservative treatment pathway with IV fluids, slow advancement of diet and pain control   - Initiate full liquids today given continued  moderate improvement in pain   - Amylase, lipase trending down.  Lipase at 612 on 3/2  - Pain medications as needed. Oxycodone 5-10 mg Q 4 hrs PRN and Dilaudid 0.3 -0.5 mg q 3hrs PRN for breakthrough pain   - Will await further recommendations from GI team. There may be a consideration for repeat imaging during this admission      2) DM II . Uncontrolled ( A1C at 8.8)   On a complex regimen involving Lantus, physically mixed long/short acting insulin, sliding scale and metformin.  - Patient counseled to discuss endocrinology consultation with his PCP as his regimen is unusually complex  - Will continue to hold the mix for now   -  HS lantus at 30 units( May consider increasing the dose depending on diet advancement). SSI changed to high intensity  Also will add carb correction for 1 unit for 10 gms of carbs.  - Advised patient that he will have to establish with an endocrinologist once he goes back to [place] to simplify his rather complex regimen    We will make an appointment with Dr Pedro Luis Interiano ( consultant Endocrinologist) at [facility] in 2 months.                                                                             3) CAD with stenting  Stent placed in 2009  Currently Aspirin on hold.  "Will confirm if further procedures need to be done before resuming aspirin           4) Hypertension  Continue home coreg, HCTZ ( On hold) , lisinopril, spironolactone      Consulting teams: GI  Code status: Full   DVT Prophylaxis: PCD's   Gastric prophylaxis: None   Diet: NPO, likely to advance to clears today   Disposition: Likely early next week       Patient seen and examined with RN         Interval History:   Progress notes in the last 24 hrs by MDT team has been reviewed.  Prince continues to feel slightly better. It took him all day to muster courage to drink clear fluids   He is slightly reluctant to take full liquids as he is worried about abdominal pain after consumption  Had fevers overnight that resolved  Denies chest pain/ SOB  Ambulating the halls independently          Review of Systems:   A comprehensive review of systems was performed and found to be negative except: Those that are outlined in interval history              Medications:   I have reviewed this patient's current medications which are outlined in the \"current medication\" section of EPIC             Physical Exam:   Vitals were reviewed  Temp: 98.1  F (36.7  C) Temp src: Oral BP: 135/83 Pulse: 84 Heart Rate: 101 Resp: 18 SpO2: 94 % O2 Device: None (Room air)    Constitutional:   awake, alert, cooperative, no apparent distress, and appears stated age     Lungs:   No increased work of breathing, good air exchange, clear to auscultation bilaterally, no crackles or wheezing     Cardiovascular:   Normal apical impulse, regular rate and rhythm, normal S1 and S2, no S3 or S4, and no murmur noted     Abdomen:   No scars, normal bowel sounds, soft, non-distended,  No tenderness noted  in the epigastrium  no masses palpated, no hepatosplenomegally     Musculoskeletal:   no lower extremity pitting edema present     Neurologic:   Awake, alert, oriented to name, place and time.  Cranial nerves II-XII are grossly intact.             Data:     Most " recent labs have been evaluated and relevant labs are outlined below :    BMP    Recent Labs  Lab 03/01/17  0813 02/27/17  1226    134   POTASSIUM 4.0 4.0   CHLORIDE 102 100   HERMES 8.6 8.9   CO2 27 27   BUN 14 22   CR 1.13 1.20   * 235*     CBC    Recent Labs  Lab 02/28/17  0745 02/27/17  1226   WBC 9.0 7.7   RBC 3.92* 4.39*   HGB 11.5* 12.8*   HCT 33.5* 36.4*   MCV 86 83   MCH 29.3 29.2   MCHC 34.3 35.2   RDW 12.8 12.6    213     INR    Recent Labs  Lab 02/27/17  1226   INR 1.07     LFTs    Recent Labs  Lab 03/01/17  0813 02/27/17  1226   ALKPHOS 57 59   AST 22 15   ALT 46 47   BILITOTAL 2.6* 0.6   PROTTOTAL 6.7* 7.9   ALBUMIN 3.2* 4.2      PANC    Recent Labs  Lab 03/02/17  0836 03/01/17  0813 02/28/17  0745 02/27/17  1913 02/27/17  1226   LIPASE 612* 1232* 3585* 2000* 353   AMYLASE  --  183* 249* 141* 62       Dr GARRY Mcrae MD  Hospitalist ( Internal medicine)  Pager: [phone]

## 2017-03-02 NOTE — PROGRESS NOTES
Post ERCP (2/27/2017) with Dr. Heredia: Follow-up    Post procedure recommendations: Repeat ERCP in 2 weeks for repeat stricture management, unless course suggests a delayed approach.     Left message for Prince to call with questions/concerns and that plan was to have follow-up ERCP in 2 weeks.     Orders placed: ERCP and sent to scheduling.    Contact information verified for future questions/concerns.    Maggie CHERRY RN Coordinator  Dr. Carolina, Dr. Heredia & Dr. Thompson  Pancreas[facility]Biliary  [phone]

## 2017-03-02 NOTE — PROGRESS NOTES
Jason called back to let us know he is still admitted to the hospital post procedure. He will find out if 2 weeks is really the recommendation now and let us know. We will also watch for more information as he gets through his hospital stay.     Maggie CHERRY RN Coordinator  Dr. Carolina, Dr. Heredia & Dr. Thompson  Pancreas[facility]Biliary  [phone] #[phone]

## 2017-03-02 NOTE — PROVIDER NOTIFICATION
Notified NP at 8:30 PM regarding elevated temperature.      Spoke with: Elia Stern    Orders were obtained.    Comments: Temp 101F after taking Tylenol. Blood cultures ordered.

## 2017-03-02 NOTE — TELEPHONE ENCOUNTER
Called patient to schedule a repeat ERCP with Dr. Heredia.   He advised that he was still admitted.     SR 03/02/2017  [id number]

## 2017-03-02 NOTE — PLAN OF CARE
Problem: Goal Outcome Summary  Goal: Goal Outcome Summary  Outcome: No Change  Temp 99.9, OVSS on RA. Clear liquid diet, had 4 oz of sprite diluted with water and had pancreatic discomfort afterwards. Prn tylenol + 5 mg oxy given at this time with some relief. Ambulated in halls x1 with writer, up with assist of 1. Voiding spont with adequate UOP. Continue POC.

## 2017-03-02 NOTE — PLAN OF CARE
Problem: Goal Outcome Summary  Goal: Goal Outcome Summary  Outcome: No Change  Pt transferred to . Temp elevated, max 101.6F - took prn tylenol. Up with SBA. Voiding. BG check 205. Lantus and Novolog given. Clear liquid diet. Pt had half cup of soup. Took 5 mg oxycodone prn after eating d/t increasing pain. LR infusing into PIV @ 75 mL/hr. Continue with POC.

## 2017-03-02 NOTE — DISCHARGE INSTRUCTIONS
We have scheduled an appointment for you on Thursday 5/4 at 1:00 pm with Dr. Pedro Luis Interiano to establish care and management of diabetes.  [facility]  [address] [place] [address]  Phone #: [phone]

## 2017-03-03 LAB
ANION GAP SERPL CALCULATED.3IONS-SCNC: 10 MMOL/L (ref 3–14)
BUN SERPL-MCNC: 12 MG/DL (ref 7–30)
CALCIUM SERPL-MCNC: 9 MG/DL (ref 8.5–10.1)
CHLORIDE SERPL-SCNC: 102 MMOL/L (ref 94–109)
CO2 SERPL-SCNC: 28 MMOL/L (ref 20–32)
CREAT SERPL-MCNC: 1.01 MG/DL (ref 0.66–1.25)
GFR SERPL CREATININE-BSD FRML MDRD: 75 ML/MIN/1.7M2
GLUCOSE BLDC GLUCOMTR-MCNC: 176 MG/DL (ref 70–99)
GLUCOSE BLDC GLUCOMTR-MCNC: 202 MG/DL (ref 70–99)
GLUCOSE BLDC GLUCOMTR-MCNC: 217 MG/DL (ref 70–99)
GLUCOSE BLDC GLUCOMTR-MCNC: 236 MG/DL (ref 70–99)
GLUCOSE SERPL-MCNC: 214 MG/DL (ref 70–99)
LIPASE SERPL-CCNC: 419 U/L (ref 73–393)
POTASSIUM SERPL-SCNC: 3.5 MMOL/L (ref 3.4–5.3)
SODIUM SERPL-SCNC: 140 MMOL/L (ref 133–144)

## 2017-03-03 PROCEDURE — 99233 SBSQ HOSP IP/OBS HIGH 50: CPT | Performed by: INTERNAL MEDICINE

## 2017-03-03 PROCEDURE — A9270 NON-COVERED ITEM OR SERVICE: HCPCS | Mod: GY | Performed by: INTERNAL MEDICINE

## 2017-03-03 PROCEDURE — 25800025 ZZH RX 258: Performed by: INTERNAL MEDICINE

## 2017-03-03 PROCEDURE — A9270 NON-COVERED ITEM OR SERVICE: HCPCS | Mod: GY | Performed by: NURSE PRACTITIONER

## 2017-03-03 PROCEDURE — 25000132 ZZH RX MED GY IP 250 OP 250 PS 637: Mod: GY | Performed by: NURSE PRACTITIONER

## 2017-03-03 PROCEDURE — 36415 COLL VENOUS BLD VENIPUNCTURE: CPT | Performed by: INTERNAL MEDICINE

## 2017-03-03 PROCEDURE — 25000132 ZZH RX MED GY IP 250 OP 250 PS 637: Mod: GY | Performed by: INTERNAL MEDICINE

## 2017-03-03 PROCEDURE — 83690 ASSAY OF LIPASE: CPT | Performed by: INTERNAL MEDICINE

## 2017-03-03 PROCEDURE — 00000146 ZZHCL STATISTIC GLUCOSE BY METER IP

## 2017-03-03 PROCEDURE — 80048 BASIC METABOLIC PNL TOTAL CA: CPT | Performed by: INTERNAL MEDICINE

## 2017-03-03 PROCEDURE — 12000001 ZZH R&B MED SURG/OB UMMC

## 2017-03-03 RX ORDER — BISACODYL 10 MG
10 SUPPOSITORY, RECTAL RECTAL DAILY PRN
Status: DISCONTINUED | OUTPATIENT
Start: 2017-03-03 | End: 2017-03-04 | Stop reason: HOSPADM

## 2017-03-03 RX ORDER — OXYCODONE HYDROCHLORIDE 5 MG/1
5-10 TABLET ORAL EVERY 4 HOURS PRN
Status: DISCONTINUED | OUTPATIENT
Start: 2017-03-03 | End: 2017-03-04 | Stop reason: HOSPADM

## 2017-03-03 RX ORDER — DIPHENHYDRAMINE HCL 25 MG
25-50 CAPSULE ORAL EVERY 6 HOURS PRN
Status: DISCONTINUED | OUTPATIENT
Start: 2017-03-03 | End: 2017-03-04 | Stop reason: HOSPADM

## 2017-03-03 RX ADMIN — HYDROMORPHONE HYDROCHLORIDE 1 MG: 2 TABLET ORAL at 07:10

## 2017-03-03 RX ADMIN — ACETAMINOPHEN 975 MG: 325 TABLET, FILM COATED ORAL at 10:53

## 2017-03-03 RX ADMIN — CYCLOSPORINE 1 DROP: 0.5 EMULSION OPHTHALMIC at 08:50

## 2017-03-03 RX ADMIN — SODIUM CHLORIDE, POTASSIUM CHLORIDE, SODIUM LACTATE AND CALCIUM CHLORIDE: 600; 310; 30; 20 INJECTION, SOLUTION INTRAVENOUS at 02:11

## 2017-03-03 RX ADMIN — ACETAMINOPHEN 975 MG: 325 TABLET, FILM COATED ORAL at 19:07

## 2017-03-03 RX ADMIN — HYDROMORPHONE HYDROCHLORIDE 1 MG: 2 TABLET ORAL at 20:06

## 2017-03-03 RX ADMIN — HYDROMORPHONE HYDROCHLORIDE 1 MG: 2 TABLET ORAL at 16:52

## 2017-03-03 RX ADMIN — HYDROMORPHONE HYDROCHLORIDE 1 MG: 2 TABLET ORAL at 23:30

## 2017-03-03 RX ADMIN — ASPIRIN 325 MG: 325 TABLET, DELAYED RELEASE ORAL at 12:39

## 2017-03-03 RX ADMIN — LISINOPRIL 20 MG: 20 TABLET ORAL at 08:50

## 2017-03-03 RX ADMIN — CARVEDILOL 6.25 MG: 6.25 TABLET, FILM COATED ORAL at 08:50

## 2017-03-03 RX ADMIN — ATORVASTATIN CALCIUM 40 MG: 40 TABLET, FILM COATED ORAL at 08:50

## 2017-03-03 RX ADMIN — BIMATOPROST 1 DROP: 0.1 SOLUTION/ DROPS OPHTHALMIC at 23:30

## 2017-03-03 RX ADMIN — SPIRONOLACTONE 25 MG: 25 TABLET ORAL at 08:50

## 2017-03-03 RX ADMIN — HYDROMORPHONE HYDROCHLORIDE 1 MG: 2 TABLET ORAL at 12:39

## 2017-03-03 RX ADMIN — SPIRONOLACTONE 25 MG: 25 TABLET ORAL at 20:06

## 2017-03-03 RX ADMIN — METFORMIN HYDROCHLORIDE 1000 MG: 500 TABLET, FILM COATED ORAL at 12:38

## 2017-03-03 RX ADMIN — METFORMIN HYDROCHLORIDE 1000 MG: 500 TABLET, FILM COATED ORAL at 19:08

## 2017-03-03 RX ADMIN — DIPHENHYDRAMINE HYDROCHLORIDE 25 MG: 25 CAPSULE ORAL at 00:59

## 2017-03-03 RX ADMIN — CYCLOSPORINE 1 DROP: 0.5 EMULSION OPHTHALMIC at 20:07

## 2017-03-03 ASSESSMENT — PAIN DESCRIPTION - DESCRIPTORS: DESCRIPTORS: ACHING

## 2017-03-03 NOTE — PLAN OF CARE
Problem: Goal Outcome Summary  Goal: Goal Outcome Summary  Outcome: Improving  Pt reports some abdominal discomfort/pain relieved with PO Dilaudid and Tylenol. Denies itching. Tolerating full liquids today w/o complaints of nausea or fullness. Reports passing flatus, no BM today. Voiding with good output. Showered today. Up with SBA/ad marty. Insulin orders changed per MD. Continue assessing BGs, pain levels and tolerance of diet.

## 2017-03-03 NOTE — PLAN OF CARE
Problem: Goal Outcome Summary  Goal: Goal Outcome Summary  Outcome: Therapy, progress towards functional goals is fair  HD#4 admitted with pancreatitis, ERCP done on 2/27. A&Ox4, VSS on room air, PIV infusing IVM, Oxycodone was causing itching, changed to dilaudid, benadryl available PRN. Up independent, LS clear, BS hypoactive. Denies nausea, tolerating a Full liquid diet. Plan is to control pain.

## 2017-03-03 NOTE — PROGRESS NOTES
CLINICAL NUTRITION SERVICES  Reason for Assessment:  Nutrition education regarding carb counting in DM type 2  Diet History:  Pt notes long history of DM II. HE does not know how to count carbohydrates d/t poor memory, however he was able to name off a few basic foods and cho grams for writer (bread, apple).  He follows the FODMAP diet at home, he notes this is for pancreatitis and this has helped his symptoms.   Nutrition Diagnosis:  Food- and nutrition-related knowledge deficit r/t poor memory and not being proactive regarding getting resources AEB pt verbalization.   Interventions:  1. Discussed that given current BS (with basically no PO intake) patient would need adjustments to insulin regimen. Plan is for patient to follow up with PCP or endocrinology as outpatient. RD also discussed this team.  2. Recommended patient start a food list and write down the foods he eats and figure out the carbohydrate grams in the foods using the carbohydrate booklet RD provided. He can then leave have this list on his refrigerator as a quick resource.  Also recommended patient use calorieking.com to assist with cho counting but patient said he won't use a computer. If there are foods that the patient can not determine the carbohydrate grams of he is to make an outpatient appointment with a RD or diabetes educator to get assistance w/ this. Discussed keeping carbohydrate at 45-75g per meal.   3. Provided handouts: carbohydrate counting and Guide to carbohydrate counting booklet.   Goals:   Patient will verbalize understanding of above nutrition plan.   Follow-up:    Patient to ask any further nutrition-related questions before discharge.  In addition, pt may request outpatient RD appointment.    Dilcia Mortensen RD, LD  Unit pager: [phone]

## 2017-03-03 NOTE — PROGRESS NOTES
[facility], [place]   Internal Medicine Daily Note          Assessment and Plan:   Prince Agarwal is a 61 year old male with a history of chronic pancreatitis, COLLIN, CAD with stenting, hypertension, CKD and DM II who presented to the hospital today for an ERCP.     1) Post ERCP Pancreatitis ( Resolving)  Patient with chronic pancreatitis now s/p PD stenosis dilation and stent placement with concern for wire ductal disruption.   Post ERCP he had significant worsening of his abdominal pain, inability to tolerate PO and elevation of his amylase and lipase.  Was on  conservative treatment pathway with IV fluids, slow advancement of diet and pain control   - Initiated full liquids on 3/3 with moderate improvement.  - Amylase, lipase trending down.  Lipase at 419 on 3/3  - Pain medications Changes to Dialudid 1 mg Q 3  hrs PRN  ( itching with Oxycodone) and Dilaudid 0.3 -0.5 mg q 3hrs PRN for breakthrough pain      CT abdomen with contrast on 3/3:  1. New pancreatic duct stent extending from the pancreatic body to second portion of duodenum. There is extensive peripancreatic fat stranding surrounding the pancreatic head and body, may represent acute pancreatitis vs sequela of postprocedural pancreatic leak. No evidence of pancreatic parenchymal necrosis.  2. Peripancreatic stranding extends towards the lesser curvature of the stomach and along the SMA mesentery with associated moderate segmental narrowing of the SMA and ANDREAS.  3. Redemonstration of pancreatic ductal stones with increased distal pancreatic ductal dilatation and progressive parenchymal atrophy. Pancreatic tail is hypoattenuating with indistinct margins and mild  associated stranding, likely represents progressive pancreatic tail fibrosis and scarring as demonstrated on comparison MRI.     This has been reviewed by GI team as well. Continue conservative plan as originally outlined. Patient to return back for another ERCP in 2  "weeks          2) DM II . Uncontrolled ( A1C at 8.8)   On a complex regimen involving Lantus, physically mixed long/short acting insulin, sliding scale and metformin.  - Patient counseled to discuss endocrinology consultation with his PCP as his regimen is unusually complex ( Appoint made with Dr Interiano in mid April.  - Will continue to hold the mix for now as patient does not feel comfortable with mixing his insulin   Given this, it is indeed complex to come up with a home regimen in the face of advancing diet.  By evaluating his insulin needs in the last 24 hrs and knowing that he is insulin resistant and at some point needed >100 units of lantus in the past, following regimen at discharge:  Stop physically mixing insulin  Resume metformin   Lantus 22 units BID with room to go up by 2-3 units BID every day with fasting blood sugars >200  High intensity sliding scale  Carb coverage for 1 unit for 15 gms of carb  We will have RD re-educate patient on carbohydrate coverage      3) CAD with stenting  Stent placed in 2009  Resume home aspirin at discharge        4) Hypertension  Continue home coreg, HCTZ (will resume at discharge ) , lisinopril, spironolactone      Consulting teams: GI  Code status: Full   DVT Prophylaxis: PCD's   Gastric prophylaxis: None   Diet: Full liquid diet  Disposition: Home tomorrow on 3/4      Patient seen and examined with RN         Interval History:   Progress notes in the last 24 hrs by MDT team has been reviewed.  Prince feels better today. Does not want to take pain medications  Denies further fevers  Tolerating full liquid diet           Review of Systems:   A comprehensive review of systems was performed and found to be negative except: Those that are outlined in interval history              Medications:   I have reviewed this patient's current medications which are outlined in the \"current medication\" section of EPIC             Physical Exam:   Vitals were reviewed  Temp: 99.4  F " (37.4  C) Temp src: Oral BP: 146/60 Pulse: 83 Heart Rate: 82 Resp: 16 SpO2: 93 % O2 Device: None (Room air)    Constitutional:   awake, alert, cooperative, no apparent distress, and appears stated age     Lungs:   No increased work of breathing, good air exchange, clear to auscultation bilaterally, no crackles or wheezing     Cardiovascular:   Normal apical impulse, regular rate and rhythm, normal S1 and S2, no S3 or S4, and no murmur noted     Abdomen:   No scars, normal bowel sounds, soft, non-distended,  No tenderness noted  in the epigastrium  no masses palpated, no hepatosplenomegally     Musculoskeletal:   no lower extremity pitting edema present     Neurologic:   Awake, alert, oriented to name, place and time.  Cranial nerves II-XII are grossly intact.             Data:     Most recent labs have been evaluated and relevant labs are outlined below :    BMP    Recent Labs  Lab 03/03/17  0615 03/02/17  1727 03/01/17  0813 02/27/17  1226    137 136 134   POTASSIUM 3.5 3.9 4.0 4.0   CHLORIDE 102 103 102 100   HERMES 9.0 8.6 8.6 8.9   CO2 28 28 27 27   BUN 12 12 14 22   CR 1.01 0.98 1.13 1.20   * 208* 224* 235*     CBC    Recent Labs  Lab 02/28/17  0745 02/27/17  1226   WBC 9.0 7.7   RBC 3.92* 4.39*   HGB 11.5* 12.8*   HCT 33.5* 36.4*   MCV 86 83   MCH 29.3 29.2   MCHC 34.3 35.2   RDW 12.8 12.6    213     INR    Recent Labs  Lab 02/27/17  1226   INR 1.07     LFTs    Recent Labs  Lab 03/01/17  0813 02/27/17  1226   ALKPHOS 57 59   AST 22 15   ALT 46 47   BILITOTAL 2.6* 0.6   PROTTOTAL 6.7* 7.9   ALBUMIN 3.2* 4.2      PANC    Recent Labs  Lab 03/03/17  0615 03/02/17  0836 03/01/17  0813 02/28/17  0745 02/27/17  1913 02/27/17  1226   LIPASE 419* 612* 1232* 3585* 2000* 353   AMYLASE  --   --  183* 249* 141* 62       Dr GARRY Mcrae MD  Hospitalist ( Internal medicine)  Pager: [phone]

## 2017-03-03 NOTE — PROGRESS NOTES
"[facility]  GASTROENTEROLOGY PROGRESS NOTE  Prince Agarwal [id number]   03/03/2017    - Had CT scan done last night: no new abscess seen.   - Pain better.   - Tolerating the advanced diet better.     OBJECTIVE:  VS: /60 (BP Location: Right arm)  Pulse 83  Temp 99.4  F (37.4  C) (Oral)  Resp 16  Ht 1.753 m (5' 9\")  Wt 115.9 kg (255 lb 9.6 oz)  SpO2 93%  BMI 37.75 kg/m2   GEN: A&Ox3, NAD, comfortable  CV:  RRR, no M/G/R  PULM:  CTA B/L  ABD: Normoactive bowel sounds, soft, ND, NT, no HSM      REVIEW OF LABORATORY, PATHOLOGY AND IMAGING RESULTS:  BMP  Recent Labs  Lab 03/03/17  0615 03/02/17  1727 03/01/17  0813 02/27/17  1226    137 136 134   POTASSIUM 3.5 3.9 4.0 4.0   CHLORIDE 102 103 102 100   HERMES 9.0 8.6 8.6 8.9   CO2 28 28 27 27   BUN 12 12 14 22   CR 1.01 0.98 1.13 1.20   * 208* 224* 235*       CBC  Recent Labs  Lab 02/28/17  0745 02/27/17  1226   WBC 9.0 7.7   RBC 3.92* 4.39*   HGB 11.5* 12.8*   HCT 33.5* 36.4*   MCV 86 83   MCH 29.3 29.2   MCHC 34.3 35.2   RDW 12.8 12.6    213       INR  Recent Labs  Lab 02/27/17  1226   INR 1.07       LFTs  Recent Labs  Lab 03/01/17  0813 02/27/17  1226   ALKPHOS 57 59   AST 22 15   ALT 46 47   BILITOTAL 2.6* 0.6   PROTTOTAL 6.7* 7.9   ALBUMIN 3.2* 4.2        PANC  Recent Labs  Lab 03/03/17  0615 03/02/17  0836 03/01/17  0813 02/28/17  0745 02/27/17  1913 02/27/17  1226   LIPASE 419* 612* 1232* 3585* 2000* 353   AMYLASE  --   --  183* 249* 141* 62       IMPRESSION:  Prince Agarwal is a 61 year old male admitted after a complex ERCP done for high grade stricture in the PD, with pain and elevated lipase currently feeling better and lipase is down trending.   CT scan negative for any abscess.        RECOMMENDATIONS:  - Will recommend reducing IVF once patient starts tolerating the diet better.   - Pain and nausea control.   - Advance diet as tolerated.   - Rest of the care as per the primary team.   -  Will be scheduled for ERCP in 2 weeks. Okay " to discharge once tolerates diet and pain controlled (has chronic pain at baseline).    It has been a pleasure to participate in the care of this patient.  Patient was discussed with GI staff, Dr. Heredia.  Please feel free to page with questions.     Chandrika Kelly MD  Therapeutic Endoscopy Fellow  [phone]

## 2017-03-03 NOTE — PLAN OF CARE
VSS on RA. Ambulating in [place]nandez with SBA or independently. BG checks 210 and 241. Novolog coverage and Lantus given. Took oxycodone prn before eating - was able to tolerate better with pain med prior to eating. Full liquid diet. PRN simethicone ordered for gas pain. Took benadryl for itching. Went down for CT. Continue with POC.

## 2017-03-04 VITALS
BODY MASS INDEX: 37.86 KG/M2 | SYSTOLIC BLOOD PRESSURE: 131 MMHG | RESPIRATION RATE: 16 BRPM | HEIGHT: 69 IN | WEIGHT: 255.6 LBS | HEART RATE: 71 BPM | DIASTOLIC BLOOD PRESSURE: 65 MMHG | TEMPERATURE: 98.6 F | OXYGEN SATURATION: 94 %

## 2017-03-04 LAB — GLUCOSE BLDC GLUCOMTR-MCNC: 232 MG/DL (ref 70–99)

## 2017-03-04 PROCEDURE — 00000146 ZZHCL STATISTIC GLUCOSE BY METER IP

## 2017-03-04 PROCEDURE — 25000132 ZZH RX MED GY IP 250 OP 250 PS 637: Mod: GY | Performed by: NURSE PRACTITIONER

## 2017-03-04 PROCEDURE — 25000132 ZZH RX MED GY IP 250 OP 250 PS 637: Mod: GY | Performed by: INTERNAL MEDICINE

## 2017-03-04 PROCEDURE — A9270 NON-COVERED ITEM OR SERVICE: HCPCS | Mod: GY | Performed by: INTERNAL MEDICINE

## 2017-03-04 PROCEDURE — 99239 HOSP IP/OBS DSCHRG MGMT >30: CPT | Performed by: INTERNAL MEDICINE

## 2017-03-04 PROCEDURE — A9270 NON-COVERED ITEM OR SERVICE: HCPCS | Mod: GY | Performed by: NURSE PRACTITIONER

## 2017-03-04 RX ORDER — HYDROMORPHONE HYDROCHLORIDE 2 MG/1
1 TABLET ORAL
Qty: 30 TABLET | Refills: 0 | Status: SHIPPED | OUTPATIENT
Start: 2017-03-04

## 2017-03-04 RX ORDER — AMOXICILLIN 250 MG
2 CAPSULE ORAL 2 TIMES DAILY
Qty: 100 TABLET | Refills: 1 | Status: SHIPPED | OUTPATIENT
Start: 2017-03-04 | End: 2017-03-16

## 2017-03-04 RX ADMIN — SPIRONOLACTONE 25 MG: 25 TABLET ORAL at 09:30

## 2017-03-04 RX ADMIN — CYCLOSPORINE 1 DROP: 0.5 EMULSION OPHTHALMIC at 09:25

## 2017-03-04 RX ADMIN — ACETAMINOPHEN 975 MG: 325 TABLET, FILM COATED ORAL at 06:02

## 2017-03-04 RX ADMIN — LISINOPRIL 20 MG: 20 TABLET ORAL at 09:30

## 2017-03-04 RX ADMIN — CARVEDILOL 6.25 MG: 6.25 TABLET, FILM COATED ORAL at 09:30

## 2017-03-04 RX ADMIN — SENNOSIDES AND DOCUSATE SODIUM 2 TABLET: 8.6; 5 TABLET ORAL at 00:41

## 2017-03-04 RX ADMIN — METFORMIN HYDROCHLORIDE 1000 MG: 500 TABLET, FILM COATED ORAL at 09:30

## 2017-03-04 RX ADMIN — ASPIRIN 325 MG: 325 TABLET, DELAYED RELEASE ORAL at 09:31

## 2017-03-04 RX ADMIN — HYDROMORPHONE HYDROCHLORIDE 1 MG: 2 TABLET ORAL at 11:07

## 2017-03-04 RX ADMIN — HYDROMORPHONE HYDROCHLORIDE 1 MG: 2 TABLET ORAL at 06:02

## 2017-03-04 RX ADMIN — ATORVASTATIN CALCIUM 40 MG: 40 TABLET, FILM COATED ORAL at 09:30

## 2017-03-04 ASSESSMENT — PAIN DESCRIPTION - DESCRIPTORS: DESCRIPTORS: ACHING

## 2017-03-04 NOTE — PLAN OF CARE
Problem: Goal Outcome Summary  Goal: Goal Outcome Summary  Outcome: Improving  VSS. Up ad marty, voids spont. Pt reports some abdominal discomfort/pain relieved with PO Dilaudid and Tylenol. Tolerating full liquids today w/o complaints of nausea or fullness. Reports passing flatus, no BM, senna given without result. Anxious to go home.

## 2017-03-04 NOTE — PLAN OF CARE
Problem: Goal Outcome Summary  Goal: Goal Outcome Summary  Outcome: Adequate for Discharge Date Met:  03/04/17  Discharge  D: Orders for discharge and outpatient medications written.   I: Home medications and return to clinic schedule reviewed with patient. Discharge instructions and parameters for calling Health Care Provider reviewed with teach back. Patient left at 11:30 am accompanied by his friend. Dilaudid script send with the patient to fill at home pharmacy.  A: Patient verbalized understanding and was ready for discharge.   P: Patient instructed to  medications in Pharmacy. Follow up as scheduled.

## 2017-03-04 NOTE — DISCHARGE SUMMARY
[facility] Discharge Summary    Prince Agarwal MRN# [id number]   Age: 61 year old YOB: 1955     Date of Admission:  2/27/2017  Date of Discharge::  3/4/2017 11:30 AM  Admitting Physician:  Modesto Yun MD  Discharge Physician:         Dr Cleo Mcrae MD   Primary Physician: Brenda Mejia  Transferring Facility: [facility]            Discharge Diagnosis:   Principle diagnosis: Acute pancreatitis S/P ERCP   Secondary diagnoses:  DM type 2  HTN  CAD  Hypercholesterolemia             Procedures:   ERCP       Allergies:      Allergies   Allergen Reactions     Oxycodone Itching     Creon [Pancrelipase]      Increased heart rate     Nexium [Esomeprazole]      Head ache     Toprol Xl [Metoprolol]                Discharge Medications:     Discharge Medication List as of 3/4/2017 10:56 AM      START taking these medications    Details   HYDROmorphone (DILAUDID) 2 MG tablet Take 0.5 tablets (1 mg) by mouth every 3 hours as needed for moderate to severe pain, Disp-30 tablet, R-0, Local Print      senna-docusate (SENOKOT-S;PERICOLACE) 8.6-50 MG per tablet Take 2 tablets by mouth 2 times daily, Disp-100 tablet, R-1, E-Prescribe         CONTINUE these medications which have CHANGED    Details   !! insulin aspart (NOVOLOG PEN) 100 UNIT/ML injection Inject 1-10 Units Subcutaneous 3 times daily (with meals) HIGH INSULIN RESISTANCE DOSING     Do Not give if Pre-Meal BG < 140.   140 - 164 give 1 unit.    165 - 189 give 2 units.    190 - 214 give 3 units.    215 - 239 give 4 units.   240 - 264 give 5 un its.    265 - 289 give 6 units.    290 - 314 give 7 units.   315 - 339 give 8 units.   340 - 364 give 9 units.  = or > 365 give 10 units, No Print Out      !! insulin aspart (NOVOLOG PEN) 100 UNIT/ML injection Inject 1-7 Units Subcutaneous At Bedtime For  - 224 give 1 units.   For  - 249 give 2 units.   For  - 274 give 3 units.   For  - 299 give  4 units.   For  - 324 give 5 units.   For  - 349 give 6 units.   For BG greater  than or equal to 350 give 7 units., No Print Out      !! insulin aspart (NOVOLOG PEN) 100 UNIT/ML injection DOSE:  1 units per 10 grams of carbohydrate TID with meals and snacks     Only chart total amount of units given.  Do not give if pre-prandial glucose is less than 60 mg/dL., No Print Out      insulin glargine (LANTUS SOLOSTAR) 100 UNIT/ML injection Inject 25 Units Subcutaneous 2 times daily 25 units evening, No Print Out       !! - Potential duplicate medications found. Please discuss with provider.      CONTINUE these medications which have NOT CHANGED    Details   Carvedilol (COREG PO) Take 6.25 mg by mouth daily, Historical      Spironolactone (ALDACTONE PO) Take 25 mg by mouth 2 times daily, Historical      METFORMIN HCL PO Take 1,000 mg by mouth 2 times daily (with meals), Historical      bimatoprost (LUMIGAN) 0.01 % SOLN 1 drop At Bedtime, Historical      RANITIDINE HCL PO Take 150 mg by mouth 2 times daily , Historical      hydrochlorothiazide (MICROZIDE) 12.5 MG capsule Take 12.5 mg by mouth daily, Historical      Ezetimibe (ZETIA PO) Take 10 mg by mouth , Historical      vitamin D (ERGOCALCIFEROL) 87520 UNIT capsule Take 50,000 Units by mouth, Historical      ACETAMINOPHEN PO Historical      mometasone (NASONEX) 50 MCG/ACT spray Spray 2 sprays into both nostrils daily, Historical      albuterol (PROAIR HFA/PROVENTIL HFA/VENTOLIN HFA) 108 (90 BASE) MCG/ACT Inhaler Inhale 2 puffs into the lungs every 6 hours, Historical      ASPIRIN PO Take 325 mg by mouth daily , Historical      LISINOPRIL PO Take 20 mg by mouth daily , Historical      ATORVASTATIN CALCIUM PO Take 40 mg by mouth daily , Historical      Cholecalciferol (VITAMIN D3 PO) Take 1,000 Units by mouth , Historical      cycloSPORINE (RESTASIS) 0.05 % ophthalmic emulsion 1 drop 2 times daily, Historical      cetirizine (ZYRTEC) 10 MG tablet Take 10 mg by  mouth daily, Historical      carboxymethylcellulose (REFRESH PLUS) 0.5 % SOLN ophthalmic solution 1 drop 3 times daily as needed for dry eyes, Historical      ammonium lactate (LAC-HYDRIN) 12 % lotion Apply topically 2 times dailyHistorical      cholestyramine (QUESTRAN) 4 G Packet Take 1 packet by mouth 3 times daily (with meals), Historical         STOP taking these medications       RIFAXIMIN PO Comments:   Reason for Stopping:         insulin aspart prot & aspart (NOVOLOG MIX 70/30 FLEXPEN) injection Comments:   Reason for Stopping:         Diclofenac Sodium (VOLTAREN PO) Comments:   Reason for Stopping:                     Consultations:   Consultation during this admission received from gastroenterology          Brief History of Presenting Illness:   Prince Agarwal is a 61 year old male with a history of chronic pancreatitis, COLLIN, CAD with stenting, hypertension, CKD and DM II who presented to the hospital on 2/27 for an ERCP.Post procedure, patient had significant abdominal pain with concern of iatrogenic duct disruption and hence admitted to the medical floor   Please see detailed H&P by Dr Jacob on 2/27/2017              Hospital Course:   1) Post ERCP Pancreatitis ( Resolving)  Patient with chronic pancreatitis now s/p PD stenosis dilation and stent placement with concern for wire ductal disruption.  Post ERCP he had significant worsening of his abdominal pain, inability to tolerate PO and elevation of his amylase and lipase.  Was on  conservative treatment pathway with IV fluids, slow advancement of diet and pain control   - Initiated full liquids on 3/3 with moderate improvement.  - Amylase, lipase trending down. Lipase at 419 on 3/3  - Pain medications Changes to Dialudid 1 mg Q 3  hrs PRN ( itching with Oxycodone)      CT abdomen with contrast on 3/3:  1. New pancreatic duct stent extending from the pancreatic body to second portion of duodenum. There is extensive peripancreatic fat stranding  surrounding the pancreatic head and body, may represent acute pancreatitis vs sequela of postprocedural pancreatic leak. No evidence of pancreatic parenchymal necrosis.  2. Peripancreatic stranding extends towards the lesser curvature of the stomach and along the SMA mesentery with associated moderate segmental narrowing of the SMA and ANDREAS.  3. Redemonstration of pancreatic ductal stones with increased distal pancreatic ductal dilatation and progressive parenchymal atrophy. Pancreatic tail is hypoattenuating with indistinct margins and mild  associated stranding, likely represents progressive pancreatic tail fibrosis and scarring as demonstrated on comparison MRI.      This has been reviewed by GI team as well. Continue conservative plan as originally outlined. Patient to return back for another ERCP in 2 weeks            2) DM II . Uncontrolled ( A1C at 8.8)  On a complex regimen involving Lantus, physically mixed long/short acting insulin, sliding scale and metformin.  - Patient counseled to discuss endocrinology consultation with his PCP as his regimen is unusually complex ( Appoint made with Dr Interiano in mid April)  - Will continue to hold the mix for now as patient does not feel comfortable with mixing his insulin   Given this, it is indeed complex to come up with a home regimen in the face of advancing diet.  By evaluating his insulin needs in the last 24 hrs and knowing that he is insulin resistant and at some point needed  Up 100 units of lantus in the past, following regimen at discharge:  Stop physically mixing insulin  Resume metformin 1000 mg BID  Lantus 25 units BID with room to go up by 2-3 units BID every day with fasting blood sugars >200  High intensity sliding scale  Carb coverage for 1 unit for 10 gms of carb   RD re-educatedpatient on carbohydrate coverage        3) CAD with stenting  Stent placed in 2009  Resume home aspirin at discharge          4) Hypertension  Continue home coreg, HCTZ  "(resumed at discharge ) , lisinopril, spironolactone               PROGRESS ON DISCHARGE DAY   Patient feels well today   Able to tolerate full liquid diet without the need for pain medications   Denies fevers or chills  Significant relief with passing flatus followed by bowel movement  Denies abdominal pian/ nausea   /65 (BP Location: Right arm)  Pulse 71  Temp 98.6  F (37  C) (Oral)  Resp 16  Ht 1.753 m (5' 9\")  Wt 115.9 kg (255 lb 9.6 oz)  SpO2 94%  BMI 37.75 kg/m2  CVS: Normal Heart sounds   RS: Clear breath sounds bilaterally   Abdomen: Soft and non tender. Normal bowel sounds.   Neuro: Alert and orientated X 3          Pending Tests at Discharge:     Unresulted Labs Ordered in the Past 30 Days of this Admission     Date and Time Order Name Status Description    3/1/2017 2101 Blood culture Preliminary     3/1/2017 2101 Blood culture Preliminary                  Discharge instructions and Follow up:       Discharge Procedure Orders  Discharge Instructions   Order Comments: Resume pre procedure diet     Discharge Instructions   Order Comments: Restart home medications.     Reason for your hospital stay   Order Comments: Post ERCP Pancreatitis     Adult [facility] Follow-up and recommended labs and tests   Order Comments: GI team will call you with an appointment for repeat ERCP within 2 weeks  Please follow up with your PCP within 1 week time  Follow up/ establish care  appointment has been made for you with Diabetes & endocrinology at [facility]   Appointments on [place] and/or [facility] (with [facility] or [facility] provider or service). Call [phone] if you haven't heard regarding these appointments within 7 days of discharge.     Activity   Order Comments: Your activity upon discharge: activity as tolerated   Order Specific Question Answer Comments   Is discharge order? Yes      Monitor and record   Order Comments: blood glucose 4 times a day, before meals and at bedtime.  You can " increase your lantus by 2-3 units morning and evening for BG > 200 in fasting state till being seen by PCP     When to contact your care team   Order Comments: Please seek medical attention if you develop severe abdominal pain or fevers and chills     Full Code     Diet   Order Comments: Follow this diet upon discharge: Orders Placed This Encounter     Full Liquid Diet. You can advance this to regular adult diet as tolerated   Order Specific Question Answer Comments   Is discharge order? Yes                Discharge Disposition:   Discharged to Home           Time spent : 35  mts total with patient and coordination of care       Dr GARRY Mcrae MD  Hospitalist ( Internal medicine)  Pager: [phone]

## 2017-03-05 LAB
GLUCOSE BLDC GLUCOMTR-MCNC: 165 MG/DL (ref 70–99)
GLUCOSE BLDC GLUCOMTR-MCNC: 268 MG/DL (ref 70–99)

## 2017-03-06 ENCOUNTER — CARE COORDINATION (OUTPATIENT)
Dept: CARDIOLOGY | Facility: CLINIC | Age: 62
End: 2017-03-06

## 2017-03-06 NOTE — PROGRESS NOTES
"[facility]  \"Hello, my name is Michelle Sewell , and I am calling from the [facility].  I want to check in and see how you are doing, after leaving the hospital.  You may also receive a call from your Care Coordinator (care team), but I want to make sure you don t have any urgent needs.  I have a couple questions to review with you:     Post-Discharge Outreach                                                    Prince Agarwal is a 61 year old male     Follow-up Appointments           Adult [facility] Follow-up and recommended labs and tests       GI team will call you with an appointment for repeat ERCP within 2 weeks  Please follow up with your PCP within 1 week time  Follow up/ establish care appointment has been made for you with Diabetes & endocrinology at [facility]   Appointments on [place] and/or [facility] (with [facility] or [facility] provider or service). Call [phone] if you haven't heard regarding these appointments within 7 days of discharge.                 Care Team:    Patient Care Team       Relationship Specialty Notifications Start End    Brenda Mejia MD PCP - General Family Practice  11/7/16     Phone: [phone] Fax: [phone]         [facility] [address] [facility] [place] [address]    Karla Monroy MD     11/7/16     Phone: [phone] Fax: [phone]         [facility] GASTRO [address] [place] [address]    Brenda Mejia MD Referring Physician Family Practice  11/17/16     Phone: [phone] Fax: [phone]         [facility] [address] [facility] [place] [address]    Reuben Heredia MD MD Gastroenterology  11/17/16     Phone: [phone] Fax: [phone]         [address] [address]    Maggie Poe, RN Clinic Care Coordinator Pancreas/Bili Abnormal results only, Admissions 11/17/16     Ana Hermosillo LPN LPN   11/17/16     "         Transition of Care Review                                                      Did you have a surgery or procedure during your hospital visit? Yes   If yes, do you have any of the following:     Signs of infection:      Pain:  Yes     Pain Scale (0-10) 2/10     Location: stomach    Wound/incision concerns? No    Do you have all of your medications/refills?  Yes    Are you having any side effects or questions about your medication(s)? No    Do you have any new or worsening symptoms?  Yes- Patient is reporting some GERD    Do you have any future appointments scheduled?   Not yet             Plan                                                      Thanks for your time.  Your Care Coordinator may follow-up within the next couple days.  In the meantime if you have questions, concerns or problems call your care team.        Michelle Sewell

## 2017-03-07 LAB
BACTERIA SPEC CULT: NO GROWTH
BACTERIA SPEC CULT: NO GROWTH
MICRO REPORT STATUS: NORMAL
MICRO REPORT STATUS: NORMAL
SPECIMEN SOURCE: NORMAL
SPECIMEN SOURCE: NORMAL

## 2017-03-08 ENCOUNTER — CARE COORDINATION (OUTPATIENT)
Dept: GASTROENTEROLOGY | Facility: CLINIC | Age: 62
End: 2017-03-08

## 2017-03-08 ENCOUNTER — TELEPHONE (OUTPATIENT)
Dept: GASTROENTEROLOGY | Facility: CLINIC | Age: 62
End: 2017-03-08

## 2017-03-08 NOTE — TELEPHONE ENCOUNTER
Prince is informed that he is scheduled on 03/20/2017 at 2:25 PM with an arrival time of 12:25 PM.   He does not need a pre-op as his last ERCP was on 02/27/2017.  He was admitted for six days and just returned home.   He knows he will need a  and someone to monitor him for 24 hours post procedure.  All instructions will be mailed to Prince at his address listed in EPIC, it was verified during this call.     SR 03/08/2017  210p

## 2017-03-08 NOTE — PROGRESS NOTES
Jason called to let us know he was sent home Saturday from the hospital. He would like to schedule his follow-up ERCP.   States he is feeling much better every day. Denies nausea/vomiting. States he is having a little trouble with swallowing, his throat is sore from the procedure. States he had a little pain after eating breakfast this morning: ate an egg mcmuffin, advised it may have been too high in fat. He understands. He feels he is making his way back to normal.    He also had concerns about how long his procedure took and will it take the same amount of time.   Advised this will be determined when Dr Heredia gets in there.     He plans to bring a list of concerns with him to his next procedure. Also offered the inpatient relations line as a resource and he has the number to call if he wishes.     Maggie CHERRY RN Coordinator  Dr. Carolina, Dr. Heredia & Dr. Thompson  Pancreas~Biliary  [phone] #[phone]

## 2017-03-09 ENCOUNTER — CARE COORDINATION (OUTPATIENT)
Dept: GASTROENTEROLOGY | Facility: CLINIC | Age: 62
End: 2017-03-09

## 2017-03-09 NOTE — PROGRESS NOTES
Jason called asking for some dietary advice. This writer asked if there is a dietician around that he can see locally as I know [place] very far away. He was not impressed with the dietitian he saw locally and feels she wasn't very helpful.     He called because he had breakfast this morning with his friend and had a very big breakfast with eggs, sausage, potatoes and Vietnamese toast. After which he is experiencing some abdominal discomfort and thinks he may need to modify his diet. Advised he may need to eat lower fat foods and eat smaller more frequent meals.   Advised I will send him some information about dietary recommendations for him. He is not on enzymes and prefers not to take them (offered to order) at this time as (creon) was very expensive and he has not felt that this has been an issue until today.

## 2017-03-14 ENCOUNTER — TELEPHONE (OUTPATIENT)
Dept: NUTRITION | Facility: CLINIC | Age: 62
End: 2017-03-14

## 2017-03-14 NOTE — TELEPHONE ENCOUNTER
Nutrition Services:     Called patient per request of Maggie OROZCO.      Prince follows a regular diet but has recently started following a low FODMAP diet.      He reports that he has been more focused on small frequent meals lately as this gives him less pain.  He notes that he has pain with almost every meal.  He denies gas/bloating.  He also has loose stools which he describes as 'greasy, sticking to the toilet'.      [facility] however, he reports that she 'doesn't know anything about pancreatitis diet or FODMAP diet'.       He does not take PERT as they are too expensive and he doesn't feel like they worked very well, although, he doesn't feel like he was taking them correctly.  He took 1-2 at the beginning of a meal.     Interventions:   Reviewed diet guidelines for pancreatitis - avoiding high fat foods and alcohol, aiming for small frequent meals, limiting spicy foods and caffeine.     Reviewed PERT - effectiveness, dosing, administration etc.  D/t PI, pt would benefit from additional trial of PERT.      Recommend - Zenpep 40,000 - taking 2 capsules with meals and 1 capsule with snacks (only taking with foods that contain fat and protein).  He could take 8-10 capsules/day via 4 smaller meals = 695-869 u lipase/kg/meal wnl for therapeutic dosing of PERT (low range).    Advised pt to call with further nutrition questions/concerns.  RD contact information provided.     Kim Weaver RD, LD

## 2017-03-20 ENCOUNTER — HOSPITAL ENCOUNTER (OUTPATIENT)
Facility: CLINIC | Age: 62
Setting detail: OBSERVATION
Discharge: HOME | End: 2017-03-21
Attending: INTERNAL MEDICINE | Admitting: INTERNAL MEDICINE
Payer: MEDICARE

## 2017-03-20 ENCOUNTER — ANESTHESIA EVENT (OUTPATIENT)
Dept: SURGERY | Facility: CLINIC | Age: 62
End: 2017-03-20
Payer: MEDICARE

## 2017-03-20 ENCOUNTER — CARE COORDINATION (OUTPATIENT)
Dept: GASTROENTEROLOGY | Facility: CLINIC | Age: 62
End: 2017-03-20

## 2017-03-20 ENCOUNTER — APPOINTMENT (OUTPATIENT)
Dept: GENERAL RADIOLOGY | Facility: CLINIC | Age: 62
End: 2017-03-20
Attending: INTERNAL MEDICINE
Payer: MEDICARE

## 2017-03-20 ENCOUNTER — SURGERY (OUTPATIENT)
Age: 62
End: 2017-03-20

## 2017-03-20 ENCOUNTER — ANESTHESIA (OUTPATIENT)
Dept: SURGERY | Facility: CLINIC | Age: 62
End: 2017-03-20
Payer: MEDICARE

## 2017-03-20 LAB
ALBUMIN SERPL-MCNC: 4 G/DL (ref 3.4–5)
ALP SERPL-CCNC: 77 U/L (ref 40–150)
ALT SERPL W P-5'-P-CCNC: 59 U/L (ref 0–70)
AMYLASE SERPL-CCNC: 209 U/L (ref 30–110)
AMYLASE SERPL-CCNC: 59 U/L (ref 30–110)
ANION GAP SERPL CALCULATED.3IONS-SCNC: 8 MMOL/L (ref 3–14)
AST SERPL W P-5'-P-CCNC: 19 U/L (ref 0–45)
BILIRUB SERPL-MCNC: 1 MG/DL (ref 0.2–1.3)
BUN SERPL-MCNC: 20 MG/DL (ref 7–30)
CALCIUM SERPL-MCNC: 8.8 MG/DL (ref 8.5–10.1)
CHLORIDE SERPL-SCNC: 104 MMOL/L (ref 94–109)
CO2 SERPL-SCNC: 25 MMOL/L (ref 20–32)
CREAT SERPL-MCNC: 1.03 MG/DL (ref 0.66–1.25)
ERCP: NORMAL
ERYTHROCYTE [DISTWIDTH] IN BLOOD BY AUTOMATED COUNT: 13.1 % (ref 10–15)
GFR SERPL CREATININE-BSD FRML MDRD: 74 ML/MIN/1.7M2
GLUCOSE BLDC GLUCOMTR-MCNC: 158 MG/DL (ref 70–99)
GLUCOSE BLDC GLUCOMTR-MCNC: 159 MG/DL (ref 70–99)
GLUCOSE SERPL-MCNC: 221 MG/DL (ref 70–99)
HCT VFR BLD AUTO: 35.3 % (ref 40–53)
HGB BLD-MCNC: 12.2 G/DL (ref 13.3–17.7)
INR PPP: 1.09 (ref 0.86–1.14)
LIPASE SERPL-CCNC: 3939 U/L (ref 73–393)
LIPASE SERPL-CCNC: 537 U/L (ref 73–393)
MCH RBC QN AUTO: 29 PG (ref 26.5–33)
MCHC RBC AUTO-ENTMCNC: 34.6 G/DL (ref 31.5–36.5)
MCV RBC AUTO: 84 FL (ref 78–100)
PLATELET # BLD AUTO: 222 10E9/L (ref 150–450)
POTASSIUM SERPL-SCNC: 4.2 MMOL/L (ref 3.4–5.3)
PROT SERPL-MCNC: 7.8 G/DL (ref 6.8–8.8)
RBC # BLD AUTO: 4.21 10E12/L (ref 4.4–5.9)
SODIUM SERPL-SCNC: 137 MMOL/L (ref 133–144)
WBC # BLD AUTO: 6.5 10E9/L (ref 4–11)

## 2017-03-20 PROCEDURE — 85027 COMPLETE CBC AUTOMATED: CPT | Performed by: INTERNAL MEDICINE

## 2017-03-20 PROCEDURE — 27210794 ZZH OR GENERAL SUPPLY STERILE: Performed by: INTERNAL MEDICINE

## 2017-03-20 PROCEDURE — 83690 ASSAY OF LIPASE: CPT | Performed by: INTERNAL MEDICINE

## 2017-03-20 PROCEDURE — C1877 STENT, NON-COAT/COV W/O DEL: HCPCS | Performed by: INTERNAL MEDICINE

## 2017-03-20 PROCEDURE — 25800025 ZZH RX 258: Performed by: INTERNAL MEDICINE

## 2017-03-20 PROCEDURE — 36000061 ZZH SURGERY LEVEL 3 W FLUORO 1ST 30 MIN - UMMC: Performed by: INTERNAL MEDICINE

## 2017-03-20 PROCEDURE — 27211024 ZZHC OR SUPPLY OTHER OPNP: Performed by: INTERNAL MEDICINE

## 2017-03-20 PROCEDURE — 37000009 ZZH ANESTHESIA TECHNICAL FEE, EACH ADDTL 15 MIN: Performed by: INTERNAL MEDICINE

## 2017-03-20 PROCEDURE — A9270 NON-COVERED ITEM OR SERVICE: HCPCS | Performed by: INTERNAL MEDICINE

## 2017-03-20 PROCEDURE — 25000132 ZZH RX MED GY IP 250 OP 250 PS 637: Mod: GY | Performed by: INTERNAL MEDICINE

## 2017-03-20 PROCEDURE — 00000146 ZZHCL STATISTIC GLUCOSE BY METER IP

## 2017-03-20 PROCEDURE — 25000128 H RX IP 250 OP 636: Performed by: NURSE ANESTHETIST, CERTIFIED REGISTERED

## 2017-03-20 PROCEDURE — 99207 ZZC APP CREDIT; MD BILLING SHARED VISIT: CPT | Performed by: PHYSICIAN ASSISTANT

## 2017-03-20 PROCEDURE — 40000280 XR SURGERY CARM FLUORO GREATER THAN 5 MIN: Mod: TC

## 2017-03-20 PROCEDURE — 25000125 ZZHC RX 250: Performed by: ANESTHESIOLOGY

## 2017-03-20 PROCEDURE — 71000015 ZZH RECOVERY PHASE 1 LEVEL 2 EA ADDTL HR: Performed by: INTERNAL MEDICINE

## 2017-03-20 PROCEDURE — 25800025 ZZH RX 258: Performed by: NURSE ANESTHETIST, CERTIFIED REGISTERED

## 2017-03-20 PROCEDURE — 80053 COMPREHEN METABOLIC PANEL: CPT | Performed by: INTERNAL MEDICINE

## 2017-03-20 PROCEDURE — 99220 ZZC INITIAL OBSERVATION CARE,LEVL III: CPT | Mod: AI | Performed by: INTERNAL MEDICINE

## 2017-03-20 PROCEDURE — 71000014 ZZH RECOVERY PHASE 1 LEVEL 2 FIRST HR: Performed by: INTERNAL MEDICINE

## 2017-03-20 PROCEDURE — C1769 GUIDE WIRE: HCPCS | Performed by: INTERNAL MEDICINE

## 2017-03-20 PROCEDURE — 25000125 ZZHC RX 250: Performed by: INTERNAL MEDICINE

## 2017-03-20 PROCEDURE — 82150 ASSAY OF AMYLASE: CPT | Performed by: INTERNAL MEDICINE

## 2017-03-20 PROCEDURE — G0378 HOSPITAL OBSERVATION PER HR: HCPCS

## 2017-03-20 PROCEDURE — A9270 NON-COVERED ITEM OR SERVICE: HCPCS | Mod: GY | Performed by: INTERNAL MEDICINE

## 2017-03-20 PROCEDURE — 25000125 ZZHC RX 250: Performed by: NURSE ANESTHETIST, CERTIFIED REGISTERED

## 2017-03-20 PROCEDURE — C1726 CATH, BAL DIL, NON-VASCULAR: HCPCS | Performed by: INTERNAL MEDICINE

## 2017-03-20 PROCEDURE — 37000008 ZZH ANESTHESIA TECHNICAL FEE, 1ST 30 MIN: Performed by: INTERNAL MEDICINE

## 2017-03-20 PROCEDURE — C9399 UNCLASSIFIED DRUGS OR BIOLOG: HCPCS | Performed by: NURSE ANESTHETIST, CERTIFIED REGISTERED

## 2017-03-20 PROCEDURE — 40000170 ZZH STATISTIC PRE-PROCEDURE ASSESSMENT II: Performed by: INTERNAL MEDICINE

## 2017-03-20 PROCEDURE — 85610 PROTHROMBIN TIME: CPT | Performed by: INTERNAL MEDICINE

## 2017-03-20 PROCEDURE — 25000565 ZZH ISOFLURANE, EA 15 MIN: Performed by: INTERNAL MEDICINE

## 2017-03-20 PROCEDURE — 36000059 ZZH SURGERY LEVEL 3 EA 15 ADDTL MIN UMMC: Performed by: INTERNAL MEDICINE

## 2017-03-20 PROCEDURE — 36415 COLL VENOUS BLD VENIPUNCTURE: CPT | Performed by: INTERNAL MEDICINE

## 2017-03-20 PROCEDURE — 25500064 ZZH RX 255 OP 636: Performed by: INTERNAL MEDICINE

## 2017-03-20 DEVICE — STENT GEENEN PANCREA 5FRX3CM G49663 GPSO-SF-5-3
Type: IMPLANTABLE DEVICE | Site: PANCREATIC DUCT | Status: NON-FUNCTIONAL
Removed: 2017-06-13

## 2017-03-20 RX ORDER — LISINOPRIL 20 MG/1
20 TABLET ORAL DAILY
Status: DISCONTINUED | OUTPATIENT
Start: 2017-03-21 | End: 2017-03-20

## 2017-03-20 RX ORDER — ONDANSETRON 2 MG/ML
INJECTION INTRAMUSCULAR; INTRAVENOUS PRN
Status: DISCONTINUED | OUTPATIENT
Start: 2017-03-20 | End: 2017-03-20

## 2017-03-20 RX ORDER — AMOXICILLIN 250 MG
1-2 CAPSULE ORAL 2 TIMES DAILY PRN
Status: DISCONTINUED | OUTPATIENT
Start: 2017-03-20 | End: 2017-03-21 | Stop reason: HOSPADM

## 2017-03-20 RX ORDER — FENTANYL CITRATE 50 UG/ML
INJECTION, SOLUTION INTRAMUSCULAR; INTRAVENOUS PRN
Status: DISCONTINUED | OUTPATIENT
Start: 2017-03-20 | End: 2017-03-20

## 2017-03-20 RX ORDER — ONDANSETRON 4 MG/1
4 TABLET, ORALLY DISINTEGRATING ORAL EVERY 30 MIN PRN
Status: DISCONTINUED | OUTPATIENT
Start: 2017-03-20 | End: 2017-03-20 | Stop reason: HOSPADM

## 2017-03-20 RX ORDER — ONDANSETRON 2 MG/ML
4 INJECTION INTRAMUSCULAR; INTRAVENOUS EVERY 30 MIN PRN
Status: DISCONTINUED | OUTPATIENT
Start: 2017-03-20 | End: 2017-03-20 | Stop reason: HOSPADM

## 2017-03-20 RX ORDER — ALBUTEROL SULFATE 90 UG/1
2 AEROSOL, METERED RESPIRATORY (INHALATION) EVERY 6 HOURS PRN
Status: DISCONTINUED | OUTPATIENT
Start: 2017-03-20 | End: 2017-03-21 | Stop reason: HOSPADM

## 2017-03-20 RX ORDER — EZETIMIBE 10 MG/1
10 TABLET ORAL AT BEDTIME
Status: DISCONTINUED | OUTPATIENT
Start: 2017-03-21 | End: 2017-03-21 | Stop reason: HOSPADM

## 2017-03-20 RX ORDER — PROPOFOL 10 MG/ML
INJECTION, EMULSION INTRAVENOUS PRN
Status: DISCONTINUED | OUTPATIENT
Start: 2017-03-20 | End: 2017-03-20

## 2017-03-20 RX ORDER — DEXTROSE MONOHYDRATE 25 G/50ML
25-50 INJECTION, SOLUTION INTRAVENOUS
Status: DISCONTINUED | OUTPATIENT
Start: 2017-03-20 | End: 2017-03-21 | Stop reason: HOSPADM

## 2017-03-20 RX ORDER — ATORVASTATIN CALCIUM 40 MG/1
40 TABLET, FILM COATED ORAL DAILY
Status: DISCONTINUED | OUTPATIENT
Start: 2017-03-21 | End: 2017-03-21 | Stop reason: HOSPADM

## 2017-03-20 RX ORDER — CARBOXYMETHYLCELLULOSE SODIUM 5 MG/ML
1 SOLUTION/ DROPS OPHTHALMIC 3 TIMES DAILY PRN
Status: DISCONTINUED | OUTPATIENT
Start: 2017-03-20 | End: 2017-03-21 | Stop reason: HOSPADM

## 2017-03-20 RX ORDER — HYDROCHLOROTHIAZIDE 12.5 MG/1
12.5 CAPSULE ORAL DAILY
Status: DISCONTINUED | OUTPATIENT
Start: 2017-03-21 | End: 2017-03-20

## 2017-03-20 RX ORDER — ONDANSETRON 4 MG/1
4 TABLET, ORALLY DISINTEGRATING ORAL EVERY 6 HOURS PRN
Status: DISCONTINUED | OUTPATIENT
Start: 2017-03-20 | End: 2017-03-21 | Stop reason: HOSPADM

## 2017-03-20 RX ORDER — LIDOCAINE HYDROCHLORIDE 20 MG/ML
INJECTION, SOLUTION INFILTRATION; PERINEURAL PRN
Status: DISCONTINUED | OUTPATIENT
Start: 2017-03-20 | End: 2017-03-20

## 2017-03-20 RX ORDER — CYCLOSPORINE 0.5 MG/ML
1 EMULSION OPHTHALMIC 2 TIMES DAILY
Status: DISCONTINUED | OUTPATIENT
Start: 2017-03-21 | End: 2017-03-21 | Stop reason: HOSPADM

## 2017-03-20 RX ORDER — CHOLESTYRAMINE 4 G/9G
1 POWDER, FOR SUSPENSION ORAL
Status: DISCONTINUED | OUTPATIENT
Start: 2017-03-21 | End: 2017-03-21 | Stop reason: HOSPADM

## 2017-03-20 RX ORDER — CARVEDILOL 6.25 MG/1
6.25 TABLET ORAL DAILY
Status: DISCONTINUED | OUTPATIENT
Start: 2017-03-21 | End: 2017-03-20

## 2017-03-20 RX ORDER — FLUMAZENIL 0.1 MG/ML
0.2 INJECTION, SOLUTION INTRAVENOUS
Status: ACTIVE | OUTPATIENT
Start: 2017-03-20 | End: 2017-03-21

## 2017-03-20 RX ORDER — SODIUM CHLORIDE, SODIUM LACTATE, POTASSIUM CHLORIDE, CALCIUM CHLORIDE 600; 310; 30; 20 MG/100ML; MG/100ML; MG/100ML; MG/100ML
INJECTION, SOLUTION INTRAVENOUS CONTINUOUS PRN
Status: DISCONTINUED | OUTPATIENT
Start: 2017-03-20 | End: 2017-03-20

## 2017-03-20 RX ORDER — POLYETHYLENE GLYCOL 3350 17 G/17G
17 POWDER, FOR SOLUTION ORAL DAILY PRN
Status: DISCONTINUED | OUTPATIENT
Start: 2017-03-20 | End: 2017-03-21 | Stop reason: HOSPADM

## 2017-03-20 RX ORDER — NICOTINE POLACRILEX 4 MG
15-30 LOZENGE BUCCAL
Status: DISCONTINUED | OUTPATIENT
Start: 2017-03-20 | End: 2017-03-21 | Stop reason: HOSPADM

## 2017-03-20 RX ORDER — SODIUM CHLORIDE, SODIUM LACTATE, POTASSIUM CHLORIDE, CALCIUM CHLORIDE 600; 310; 30; 20 MG/100ML; MG/100ML; MG/100ML; MG/100ML
INJECTION, SOLUTION INTRAVENOUS CONTINUOUS
Status: DISCONTINUED | OUTPATIENT
Start: 2017-03-20 | End: 2017-03-21 | Stop reason: HOSPADM

## 2017-03-20 RX ORDER — SPIRONOLACTONE 25 MG/1
25 TABLET ORAL 2 TIMES DAILY
Status: DISCONTINUED | OUTPATIENT
Start: 2017-03-20 | End: 2017-03-20

## 2017-03-20 RX ORDER — IOPAMIDOL 510 MG/ML
INJECTION, SOLUTION INTRAVASCULAR PRN
Status: DISCONTINUED | OUTPATIENT
Start: 2017-03-20 | End: 2017-03-20 | Stop reason: HOSPADM

## 2017-03-20 RX ORDER — SODIUM CHLORIDE, SODIUM LACTATE, POTASSIUM CHLORIDE, CALCIUM CHLORIDE 600; 310; 30; 20 MG/100ML; MG/100ML; MG/100ML; MG/100ML
INJECTION, SOLUTION INTRAVENOUS CONTINUOUS
Status: DISCONTINUED | OUTPATIENT
Start: 2017-03-20 | End: 2017-03-20 | Stop reason: HOSPADM

## 2017-03-20 RX ORDER — ONDANSETRON 2 MG/ML
4 INJECTION INTRAMUSCULAR; INTRAVENOUS EVERY 6 HOURS PRN
Status: DISCONTINUED | OUTPATIENT
Start: 2017-03-20 | End: 2017-03-21 | Stop reason: HOSPADM

## 2017-03-20 RX ORDER — ACETAMINOPHEN 325 MG/1
650 TABLET ORAL EVERY 4 HOURS PRN
Status: DISCONTINUED | OUTPATIENT
Start: 2017-03-20 | End: 2017-03-21 | Stop reason: HOSPADM

## 2017-03-20 RX ORDER — INDOMETHACIN 50 MG/1
100 SUPPOSITORY RECTAL
Status: COMPLETED | OUTPATIENT
Start: 2017-03-20 | End: 2017-03-20

## 2017-03-20 RX ORDER — HYDROMORPHONE HYDROCHLORIDE 1 MG/ML
.3-.5 INJECTION, SOLUTION INTRAMUSCULAR; INTRAVENOUS; SUBCUTANEOUS EVERY 5 MIN PRN
Status: DISCONTINUED | OUTPATIENT
Start: 2017-03-20 | End: 2017-03-20 | Stop reason: HOSPADM

## 2017-03-20 RX ORDER — HYDRALAZINE HYDROCHLORIDE 20 MG/ML
10 INJECTION INTRAMUSCULAR; INTRAVENOUS EVERY 6 HOURS PRN
Status: DISCONTINUED | OUTPATIENT
Start: 2017-03-20 | End: 2017-03-21 | Stop reason: HOSPADM

## 2017-03-20 RX ORDER — LIDOCAINE 40 MG/G
CREAM TOPICAL
Status: DISCONTINUED | OUTPATIENT
Start: 2017-03-20 | End: 2017-03-20 | Stop reason: HOSPADM

## 2017-03-20 RX ORDER — CETIRIZINE HYDROCHLORIDE 10 MG/1
10 TABLET ORAL DAILY
Status: DISCONTINUED | OUTPATIENT
Start: 2017-03-21 | End: 2017-03-21 | Stop reason: HOSPADM

## 2017-03-20 RX ORDER — NALOXONE HYDROCHLORIDE 0.4 MG/ML
.1-.4 INJECTION, SOLUTION INTRAMUSCULAR; INTRAVENOUS; SUBCUTANEOUS
Status: DISCONTINUED | OUTPATIENT
Start: 2017-03-20 | End: 2017-03-20

## 2017-03-20 RX ORDER — FENTANYL CITRATE 50 UG/ML
25-50 INJECTION, SOLUTION INTRAMUSCULAR; INTRAVENOUS
Status: DISCONTINUED | OUTPATIENT
Start: 2017-03-20 | End: 2017-03-20 | Stop reason: HOSPADM

## 2017-03-20 RX ORDER — NALOXONE HYDROCHLORIDE 0.4 MG/ML
.1-.4 INJECTION, SOLUTION INTRAMUSCULAR; INTRAVENOUS; SUBCUTANEOUS
Status: DISCONTINUED | OUTPATIENT
Start: 2017-03-20 | End: 2017-03-21 | Stop reason: HOSPADM

## 2017-03-20 RX ADMIN — LIDOCAINE HYDROCHLORIDE 100 MG: 20 INJECTION, SOLUTION INFILTRATION; PERINEURAL at 14:25

## 2017-03-20 RX ADMIN — INDOMETHACIN 100 MG: 50 SUPPOSITORY RECTAL at 16:47

## 2017-03-20 RX ADMIN — IOPAMIDOL 30 ML: 510 INJECTION, SOLUTION INTRAVASCULAR at 15:02

## 2017-03-20 RX ADMIN — PHENYLEPHRINE HYDROCHLORIDE 100 MCG: 10 INJECTION, SOLUTION INTRAMUSCULAR; INTRAVENOUS; SUBCUTANEOUS at 14:25

## 2017-03-20 RX ADMIN — PROPOFOL 180 MG: 10 INJECTION, EMULSION INTRAVENOUS at 14:25

## 2017-03-20 RX ADMIN — SODIUM CHLORIDE, POTASSIUM CHLORIDE, SODIUM LACTATE AND CALCIUM CHLORIDE: 600; 310; 30; 20 INJECTION, SOLUTION INTRAVENOUS at 14:17

## 2017-03-20 RX ADMIN — IOPAMIDOL 70 ML: 510 INJECTION, SOLUTION INTRAVASCULAR at 16:48

## 2017-03-20 RX ADMIN — SUGAMMADEX 200 MG: 100 INJECTION, SOLUTION INTRAVENOUS at 16:45

## 2017-03-20 RX ADMIN — FENTANYL CITRATE 25 MCG: 50 INJECTION, SOLUTION INTRAMUSCULAR; INTRAVENOUS at 16:44

## 2017-03-20 RX ADMIN — ONDANSETRON 4 MG: 2 INJECTION INTRAMUSCULAR; INTRAVENOUS at 16:42

## 2017-03-20 RX ADMIN — FENTANYL CITRATE 50 MCG: 50 INJECTION, SOLUTION INTRAMUSCULAR; INTRAVENOUS at 15:04

## 2017-03-20 RX ADMIN — SODIUM CHLORIDE, POTASSIUM CHLORIDE, SODIUM LACTATE AND CALCIUM CHLORIDE: 600; 310; 30; 20 INJECTION, SOLUTION INTRAVENOUS at 15:24

## 2017-03-20 RX ADMIN — FENTANYL CITRATE 100 MCG: 50 INJECTION, SOLUTION INTRAMUSCULAR; INTRAVENOUS at 14:21

## 2017-03-20 RX ADMIN — SODIUM CHLORIDE, POTASSIUM CHLORIDE, SODIUM LACTATE AND CALCIUM CHLORIDE: 600; 310; 30; 20 INJECTION, SOLUTION INTRAVENOUS at 17:17

## 2017-03-20 RX ADMIN — SIMETHICONE 2 ML: 63.3; 3.7 SOLUTION/ DROPS ORAL at 12:27

## 2017-03-20 RX ADMIN — ROCURONIUM BROMIDE 50 MG: 10 INJECTION INTRAVENOUS at 14:25

## 2017-03-20 RX ADMIN — FENTANYL CITRATE 50 MCG: 50 INJECTION, SOLUTION INTRAMUSCULAR; INTRAVENOUS at 18:20

## 2017-03-20 ASSESSMENT — PAIN DESCRIPTION - DESCRIPTORS
DESCRIPTORS: SORE
DESCRIPTORS: CRAMPING

## 2017-03-20 NOTE — PROGRESS NOTES
Patient unable to tolerate creon so we will try him on Zenpep per Dr. Heredia.- left message for him to call back in regards.

## 2017-03-20 NOTE — BRIEF OP NOTE
[facility] [place]  Gastroenterology Brief Operative Note    Pre-operative diagnosis: Pancreatic duct stones and stenosis   Post-operative diagnosis Same   Procedure: ERCP   Surgeon: Reuben Heredia MD   Assistants(s): Chandrika Kelly MD   Anesthesia: General endotracheal anesthesia   Estimated blood loss: None    Total IV fluids: (See anesthesia record)   Blood transfusion: No transfusion was given during surgery   Total urine output: (See anesthesia record)   Drains: None   Specimens: None   Implants: sofflex 5F X3CM   Findings: - removed the stent with rat tooth forceps.  - Difficult passage of the wire and tome through the PD owing to stenosis seen at the genu and the body.   -dilated with a 2 and 3 mm angiocath.  - unsuccessful placement of zimmons 3F x 12 cm spt stent.  - placed a sofflex stent with over the stent sphincterotomy.  - bile duct cannulated: regular with concern for papillary stenosis, treated w biliary sphincterotomy.    Complications: None   Condition: Stable   Comments:      Recommendations:         See dictated procedure report for full details (found in chart review under 'Procedures')    - Admit to the hospital for further management.  - Continue LR @ 125 CC/HR.  -NPO for now can advance to clears if no pain noted.   - Will follow in am.     Chandrika Kelly MD  [phone]

## 2017-03-20 NOTE — ANESTHESIA CARE TRANSFER NOTE
Patient: Prince Agarwal    Procedure(s):  Endoscopic Retrograde Cholangiopancreatogram, Balloon Dilation, Pancreatic Duct Stent Exchange, Biliary Sphincterotomy, Pancreatic Sphincterotomy - Wound Class: II-Clean Contaminated    Diagnosis: Papillary Stenosis   Diagnosis Additional Information: No value filed.    Anesthesia Type:   No value filed.     Note:  Airway :Face Mask  Patient transferred to:PACU  Comments: Patient oxygenating and ventilating well on 6LFM.  Patient AREVALO, following commands, and resting comfortably.  Report given to RN and VSS,.       Vitals: (Last set prior to Anesthesia Care Transfer)    CRNA VITALS  3/20/2017 1630 - 3/20/2017 1707      3/20/2017             NIBP: (!)  122/95    NIBP Mean: 100    Ht Rate: 89    Temp: 36.2  C (97.1  F)    SpO2: 100 %    Resp Rate (observed): 16    EKG: Sinus rhythm                Electronically Signed By: ARIE Fletcher CRNA  March 20, 2017  5:07 PM

## 2017-03-20 NOTE — ANESTHESIA PREPROCEDURE EVALUATION
Anesthesia Plan      History & Physical Review      ASA Status:  3 .        Plan for General and ETT          Postoperative Care      Consents                          .

## 2017-03-20 NOTE — IP AVS SNAPSHOT
Unit [address]D Observation [address] [address]    Phone:  [phone]    Fax:  [phone]                                       After Visit Summary   3/20/2017    Prince Agarwal    MRN: [id number]           After Visit Summary Signature Page     I have received my discharge instructions, and my questions have been answered. I have discussed any challenges I see with this plan with the nurse or doctor.    ..........................................................................................................................................  Patient/Patient Representative Signature      ..........................................................................................................................................  Patient Representative Print Name and Relationship to Patient    ..................................................               ................................................  Date                                            Time    ..........................................................................................................................................  Reviewed by Signature/Title    ...................................................              ..............................................  Date                                                            Time

## 2017-03-20 NOTE — IP AVS SNAPSHOT
MRN:[id number]                      After Visit Summary   3/20/2017    Prince Agarwal    MRN: [id number]           Thank you!     Thank you for choosing [facility] for your care. Our goal is always to provide you with excellent care. Hearing back from our patients is one way we can continue to improve our services. Please take a few minutes to complete the written survey that you may receive in the mail after you visit with us. Thank you!        Patient Information     Date Of Birth          1955        About your hospital stay     You were admitted on:  March 20, 2017 You last received care in the:  Unit 6D Observation [facility]    You were discharged on:  March 21, 2017        Reason for your hospital stay       Post ercp pancreatitis                  Who to Call     For medical emergencies, please call 911.  For non-urgent questions about your medical care, please call your primary care provider or clinic, [phone]  For questions related to your surgery, please call your surgery clinic        Attending Provider     Provider Specialty    Reuben Heredia MD Gastroenterology    RESIDENT, BHASKARAN UNLICENSED --    Deepthi Marroquin MD Internal Medicine       Primary Care Provider Office Phone # Fax #    Brenda Mejia [phone]       [address] [address]        After Care Instructions     Activity       Your activity upon discharge: activity as tolerated            Diet       Follow this diet upon discharge: Orders Placed This Encounter      Full Liquid Diet advance to solid as tolerated            Discharge Instructions       Please follow up with your PCP and your Gastroenterologist as scheduled. Please consider calling your doctor for any concerning symptoms.                  Follow-up Appointments     Adult [facility] Follow-up and recommended labs and tests       Follow up with primary care provider, Brenda Mejia, within  "7 days for hospital follow- up.  No follow up labs or test are needed.      Appointments on [place] and/or [facility] (with [facility] or [facility] provider or service). Call [phone] if you haven't heard regarding these appointments within 7 days of discharge.                  Pending Results     No orders found for last 3 day(s).            Statement of Approval     Ordered          17 1409  I have reviewed and agree with all the recommendations and orders detailed in this document.  EFFECTIVE NOW     Approved and electronically signed by:  Duke Stanton MD             Admission Information     Date & Time Provider Department Dept. Phone    3/20/2017 Deepthi Marroquin MD Unit 6D Observation [facility] [place] [phone]      Your Vitals Were     Blood Pressure Temperature Respirations Height Weight Pulse Oximetry    143/71 (BP Location: Left arm) 97.6  F (36.4  C) (Oral) 18 1.75 m (5' 8.9\") 113.2 kg (249 lb 9 oz) 99%    BMI (Body Mass Index)                   36.96 kg/m2           [facility] [employer] Information     [facility] lets you send messages to your doctor, view your test results, renew your prescriptions, schedule appointments and more. To sign up, go to [email or web address] . Click on \"Log in\" on the left side of the screen, which will take you to the Welcome page. Then click on \"Sign up Now\" on the right side of the page.     You will be asked to enter the access code listed below, as well as some personal information. Please follow the directions to create your username and password.     Your access code is: [email or web address]  Expires: 2017  2:11 PM     Your access code will  in 90 days. If you need help or a new code, please call your [facility] clinic or [phone].        Care EveryWhere ID     This is your Care EveryWhere ID. This could be used by other organizations to access your [facility] medical records  [id number]           Review of your medicines      CONTINUE these medicines which may have " CHANGED, or have new prescriptions. If we are uncertain of the size of tablets/capsules you have at home, strength may be listed as something that might have changed.        Dose / Directions    insulin glargine 100 UNIT/ML injection   Commonly known as:  LANRONDA ESCALERAOSTAR   This may have changed:    - how much to take  - additional instructions   Used for:  Type 2 diabetes mellitus with hyperglycemia, with long-term current use of insulin (H)        Dose:  25 Units   Inject 25 Units Subcutaneous 2 times daily 25 units evening   Refills:  0         CONTINUE these medicines which have NOT CHANGED        Dose / Directions    ACETAMINOPHEN PO        Refills:  0       albuterol 108 (90 BASE) MCG/ACT Inhaler   Commonly known as:  PROAIR HFA/PROVENTIL HFA/VENTOLIN HFA        Dose:  2 puff   Inhale 2 puffs into the lungs every 6 hours   Refills:  0       ALDACTONE PO        Dose:  25 mg   Take 25 mg by mouth 2 times daily   Refills:  0       ammonium lactate 12 % lotion   Commonly known as:  LAC-HYDRIN        Apply topically 2 times daily   Refills:  0       ASPIRIN PO        Dose:  325 mg   Take 325 mg by mouth daily   Refills:  0       ATORVASTATIN CALCIUM PO        Dose:  40 mg   Take 40 mg by mouth daily   Refills:  0       bimatoprost 0.01 % Soln   Commonly known as:  LUMIGAN        Dose:  1 drop   1 drop At Bedtime   Refills:  0       carboxymethylcellulose 0.5 % Soln ophthalmic solution   Commonly known as:  REFRESH PLUS        Dose:  1 drop   1 drop 3 times daily as needed for dry eyes   Refills:  0       cetirizine 10 MG tablet   Commonly known as:  zyrTEC        Dose:  10 mg   Take 10 mg by mouth daily   Refills:  0       cholestyramine 4 G Packet   Commonly known as:  QUESTRAN        Dose:  1 packet   Take 1 packet by mouth 3 times daily (with meals)   Refills:  0       COREG PO        Dose:  6.25 mg   Take 6.25 mg by mouth daily   Refills:  0       cycloSPORINE 0.05 % ophthalmic emulsion   Commonly known as:   RESTASIS        Dose:  1 drop   1 drop 2 times daily   Refills:  0       hydrochlorothiazide 12.5 MG capsule   Commonly known as:  MICROZIDE        Dose:  12.5 mg   Take 12.5 mg by mouth daily   Refills:  0       HYDROmorphone 2 MG tablet   Commonly known as:  DILAUDID   Used for:  Other acute pancreatitis with uninfected necrosis        Dose:  1 mg   Take 0.5 tablets (1 mg) by mouth every 3 hours as needed for moderate to severe pain   Quantity:  30 tablet   Refills:  0       * insulin aspart 100 UNIT/ML injection   Commonly known as:  NovoLOG PEN   Used for:  Type 2 diabetes mellitus with hyperglycemia, with long-term current use of insulin (H)        Dose:  1-10 Units   Inject 1-10 Units Subcutaneous 3 times daily (with meals) HIGH INSULIN RESISTANCE DOSING    Do Not give if Pre-Meal BG < 140.  140 - 164 give 1 unit.   165 - 189 give 2 units.   190 - 214 give 3 units.   215 - 239 give 4 units.  240 - 264 give 5 units.   265 - 289 give 6 units.   290 - 314 give 7 units.  315 - 339 give 8 units.  340 - 364 give 9 units. = or > 365 give 10 units   Refills:  0       * insulin aspart 100 UNIT/ML injection   Commonly known as:  NovoLOG PEN   Used for:  Type 2 diabetes mellitus with hyperglycemia, with long-term current use of insulin (H)        Dose:  1-7 Units   Inject 1-7 Units Subcutaneous At Bedtime For  - 224 give 1 units.  For  - 249 give 2 units.  For  - 274 give 3 units.  For  - 299 give 4 units.  For  - 324 give 5 units.  For  - 349 give 6 units.  For BG greater than or equal to 350 give 7 units.   Refills:  0       * insulin aspart 100 UNIT/ML injection   Commonly known as:  NovoLOG PEN   Used for:  Type 2 diabetes mellitus with hyperglycemia, with long-term current use of insulin (H)        DOSE:  1 units per 10 grams of carbohydrate TID with meals and snacks    Only chart total amount of units given.  Do not give if pre-prandial glucose is less than 60 mg/dL.   Refills:   0       LISINOPRIL PO        Dose:  20 mg   Take 20 mg by mouth daily   Refills:  0       METFORMIN HCL PO        Dose:  1000 mg   Take 1,000 mg by mouth 2 times daily (with meals)   Refills:  0       mometasone 50 MCG/ACT spray   Commonly known as:  NASONEX        Dose:  2 spray   Spray 2 sprays into both nostrils daily   Refills:  0       RANITIDINE HCL PO        Dose:  150 mg   Take 150 mg by mouth 2 times daily   Refills:  0       vitamin D 25675 UNIT capsule   Commonly known as:  ERGOCALCIFEROL        Dose:  90196 Units   Take 50,000 Units by mouth   Refills:  0       VITAMIN D3 PO        Dose:  1000 Units   Take 1,000 Units by mouth   Refills:  0       ZETIA PO        Dose:  10 mg   Take 10 mg by mouth   Refills:  0       * Notice:  This list has 3 medication(s) that are the same as other medications prescribed for you. Read the directions carefully, and ask your doctor or other care provider to review them with you.             Protect others around you: Learn how to safely use, store and throw away your medicines at www.disposemymeds.org.             Medication List: This is a list of all your medications and when to take them. Check marks below indicate your daily home schedule. Keep this list as a reference.      Medications           Morning Afternoon Evening Bedtime As Needed    ACETAMINOPHEN PO   Last time this was given:  650 mg on 3/21/2017  1:25 PM                                albuterol 108 (90 BASE) MCG/ACT Inhaler   Commonly known as:  PROAIR HFA/PROVENTIL HFA/VENTOLIN HFA   Inhale 2 puffs into the lungs every 6 hours                                ALDACTONE PO   Take 25 mg by mouth 2 times daily                                ammonium lactate 12 % lotion   Commonly known as:  LAC-HYDRIN   Apply topically 2 times daily                                ASPIRIN PO   Take 325 mg by mouth daily                                ATORVASTATIN CALCIUM PO   Take 40 mg by mouth daily   Last time this was  given:  40 mg on 3/21/2017  9:15 AM                                bimatoprost 0.01 % Soln   Commonly known as:  LUMIGAN   1 drop At Bedtime   Last time this was given:  1 drop on 3/21/2017 12:45 AM                                carboxymethylcellulose 0.5 % Soln ophthalmic solution   Commonly known as:  REFRESH PLUS   1 drop 3 times daily as needed for dry eyes   Last time this was given:  1 drop on 3/21/2017  9:01 AM                                cetirizine 10 MG tablet   Commonly known as:  zyrTEC   Take 10 mg by mouth daily   Last time this was given:  10 mg on 3/21/2017  9:15 AM                                cholestyramine 4 G Packet   Commonly known as:  QUESTRAN   Take 1 packet by mouth 3 times daily (with meals)   Last time this was given:  4 g on 3/21/2017  5:05 PM                                COREG PO   Take 6.25 mg by mouth daily                                cycloSPORINE 0.05 % ophthalmic emulsion   Commonly known as:  RESTASIS   1 drop 2 times daily   Last time this was given:  1 drop on 3/21/2017  9:01 AM                                hydrochlorothiazide 12.5 MG capsule   Commonly known as:  MICROZIDE   Take 12.5 mg by mouth daily                                HYDROmorphone 2 MG tablet   Commonly known as:  DILAUDID   Take 0.5 tablets (1 mg) by mouth every 3 hours as needed for moderate to severe pain                                * insulin aspart 100 UNIT/ML injection   Commonly known as:  NovoLOG PEN   Inject 1-10 Units Subcutaneous 3 times daily (with meals) HIGH INSULIN RESISTANCE DOSING    Do Not give if Pre-Meal BG < 140.  140 - 164 give 1 unit.   165 - 189 give 2 units.   190 - 214 give 3 units.   215 - 239 give 4 units.  240 - 264 give 5 units.   265 - 289 give 6 units.   290 - 314 give 7 units.  315 - 339 give 8 units.  340 - 364 give 9 units. = or > 365 give 10 units   Last time this was given:  1 Units on 3/21/2017  5:12 PM                                * insulin aspart 100 UNIT/ML  injection   Commonly known as:  NovoLOG PEN   Inject 1-7 Units Subcutaneous At Bedtime For  - 224 give 1 units.  For  - 249 give 2 units.  For  - 274 give 3 units.  For  - 299 give 4 units.  For  - 324 give 5 units.  For  - 349 give 6 units.  For BG greater than or equal to 350 give 7 units.   Last time this was given:  1 Units on 3/21/2017  5:12 PM                                * insulin aspart 100 UNIT/ML injection   Commonly known as:  NovoLOG PEN   DOSE:  1 units per 10 grams of carbohydrate TID with meals and snacks    Only chart total amount of units given.  Do not give if pre-prandial glucose is less than 60 mg/dL.   Last time this was given:  1 Units on 3/21/2017  5:12 PM                                insulin glargine 100 UNIT/ML injection   Commonly known as:  LANTUS SOLOSTAR   Inject 25 Units Subcutaneous 2 times daily 25 units evening   Last time this was given:  26 Units on 3/21/2017  8:56 AM                                LISINOPRIL PO   Take 20 mg by mouth daily                                METFORMIN HCL PO   Take 1,000 mg by mouth 2 times daily (with meals)                                mometasone 50 MCG/ACT spray   Commonly known as:  NASONEX   Spray 2 sprays into both nostrils daily                                RANITIDINE HCL PO   Take 150 mg by mouth 2 times daily   Last time this was given:  150 mg on 3/21/2017  9:15 AM                                vitamin D 11254 UNIT capsule   Commonly known as:  ERGOCALCIFEROL   Take 50,000 Units by mouth                                VITAMIN D3 PO   Take 1,000 Units by mouth                                ZETIA PO   Take 10 mg by mouth                                * Notice:  This list has 3 medication(s) that are the same as other medications prescribed for you. Read the directions carefully, and ask your doctor or other care provider to review them with you.

## 2017-03-20 NOTE — LETTER
Transition Communication Hand-off for Care Transitions to Next Level of Care Provider    Name: Prince Agarwal  MRN #: [id number]  Primary Care Provider: Brenda Mejia     Primary Clinic: [address] [address]     Reason for Hospitalization:  Post-op pain [G89.18]  Admit Date/Time: 3/20/2017 12:10 PM  Discharge Date: 3/21/17  Payor Source: Payor: MEDICARE / Plan: MEDICARE / Product Type: Medicare /     Reason for Communication Hand-off Referral: Other ongoing follow up    Discharge Plan: home       Concern for non-adherence with plan of care:   Y/N no    Follow-up specialty is recommended: Yes    Follow-up plan:  No future appointments.        Key Recommendations:  See AVS    Jacquelyn Clark RN, BSN  Care Coordinator [facility]  Pager: [phone]  Phone: [phone]    AVS/Discharge Summary is the source of truth; this is a helpful guide for improved communication of patient story

## 2017-03-20 NOTE — ANESTHESIA POSTPROCEDURE EVALUATION
Patient: Prince Agarwal    Procedure(s):  Endoscopic Retrograde Cholangiopancreatogram, Balloon Dilation, Pancreatic Duct Stent Exchange, Biliary Sphincterotomy, Pancreatic Sphincterotomy - Wound Class: II-Clean Contaminated    Diagnosis:Papillary Stenosis   Diagnosis Additional Information: No value filed.    Anesthesia Type:  No value filed.    Note:  Anesthesia Post Evaluation    Patient location during evaluation: PACU  Patient participation: Able to fully participate in evaluation  Level of consciousness: awake and alert  Pain management: satisfactory to patient  Airway patency: patent  Cardiovascular status: blood pressure returned to baseline and hemodynamically stable  Respiratory status: room air  Hydration status: euvolemic  PONV: none     Anesthetic complications: None          Last vitals:  Vitals:    03/20/17 1730 03/20/17 1745 03/20/17 1800   BP: 132/79 156/78 162/89   Resp: 16 16 18   Temp: 36.7  C (98  F)  36.8  C (98.2  F)   SpO2: 100% 100% 100%         Electronically Signed By: Pop Mckeon MD  March 20, 2017  6:09 PM

## 2017-03-21 VITALS
RESPIRATION RATE: 18 BRPM | HEIGHT: 69 IN | OXYGEN SATURATION: 99 % | TEMPERATURE: 97.6 F | SYSTOLIC BLOOD PRESSURE: 143 MMHG | WEIGHT: 249.56 LBS | BODY MASS INDEX: 36.96 KG/M2 | DIASTOLIC BLOOD PRESSURE: 71 MMHG

## 2017-03-21 LAB
AMYLASE SERPL-CCNC: 130 U/L (ref 30–110)
GLUCOSE BLDC GLUCOMTR-MCNC: 265 MG/DL (ref 70–99)
GLUCOSE BLDC GLUCOMTR-MCNC: 277 MG/DL (ref 70–99)
GLUCOSE BLDC GLUCOMTR-MCNC: 281 MG/DL (ref 70–99)
GLUCOSE BLDC GLUCOMTR-MCNC: 295 MG/DL (ref 70–99)
GLUCOSE BLDC GLUCOMTR-MCNC: 327 MG/DL (ref 70–99)
GLUCOSE BLDC GLUCOMTR-MCNC: 344 MG/DL (ref 70–99)
LIPASE SERPL-CCNC: 1668 U/L (ref 73–393)

## 2017-03-21 PROCEDURE — G0378 HOSPITAL OBSERVATION PER HR: HCPCS

## 2017-03-21 PROCEDURE — 82150 ASSAY OF AMYLASE: CPT | Performed by: PHYSICIAN ASSISTANT

## 2017-03-21 PROCEDURE — 36415 COLL VENOUS BLD VENIPUNCTURE: CPT | Performed by: PHYSICIAN ASSISTANT

## 2017-03-21 PROCEDURE — 00000146 ZZHCL STATISTIC GLUCOSE BY METER IP

## 2017-03-21 PROCEDURE — 96372 THER/PROPH/DIAG INJ SC/IM: CPT

## 2017-03-21 PROCEDURE — 83690 ASSAY OF LIPASE: CPT | Performed by: PHYSICIAN ASSISTANT

## 2017-03-21 PROCEDURE — 25800025 ZZH RX 258: Performed by: INTERNAL MEDICINE

## 2017-03-21 PROCEDURE — 25000132 ZZH RX MED GY IP 250 OP 250 PS 637: Mod: GY | Performed by: PHYSICIAN ASSISTANT

## 2017-03-21 PROCEDURE — A9270 NON-COVERED ITEM OR SERVICE: HCPCS | Mod: GY | Performed by: PHYSICIAN ASSISTANT

## 2017-03-21 PROCEDURE — 25000131 ZZH RX MED GY IP 250 OP 636 PS 637: Mod: GY | Performed by: PHYSICIAN ASSISTANT

## 2017-03-21 RX ADMIN — BIMATOPROST 1 DROP: 0.1 SOLUTION/ DROPS OPHTHALMIC at 00:45

## 2017-03-21 RX ADMIN — RANITIDINE HYDROCHLORIDE 150 MG: 150 TABLET, FILM COATED ORAL at 09:15

## 2017-03-21 RX ADMIN — SODIUM CHLORIDE, POTASSIUM CHLORIDE, SODIUM LACTATE AND CALCIUM CHLORIDE: 600; 310; 30; 20 INJECTION, SOLUTION INTRAVENOUS at 01:49

## 2017-03-21 RX ADMIN — CHOLESTYRAMINE 4 G: 4 POWDER, FOR SUSPENSION ORAL at 17:05

## 2017-03-21 RX ADMIN — INSULIN ASPART 5 UNITS: 100 INJECTION, SOLUTION INTRAVENOUS; SUBCUTANEOUS at 17:07

## 2017-03-21 RX ADMIN — ACETAMINOPHEN 650 MG: 325 TABLET, FILM COATED ORAL at 13:25

## 2017-03-21 RX ADMIN — INSULIN ASPART 3 UNITS: 100 INJECTION, SOLUTION INTRAVENOUS; SUBCUTANEOUS at 12:27

## 2017-03-21 RX ADMIN — INSULIN ASPART 4 UNITS: 100 INJECTION, SOLUTION INTRAVENOUS; SUBCUTANEOUS at 08:56

## 2017-03-21 RX ADMIN — ACETAMINOPHEN 650 MG: 325 TABLET, FILM COATED ORAL at 09:05

## 2017-03-21 RX ADMIN — RANITIDINE HYDROCHLORIDE 150 MG: 150 TABLET, FILM COATED ORAL at 00:47

## 2017-03-21 RX ADMIN — CYCLOSPORINE 1 DROP: 0.5 EMULSION OPHTHALMIC at 01:48

## 2017-03-21 RX ADMIN — INSULIN ASPART 4 UNITS: 100 INJECTION, SOLUTION INTRAVENOUS; SUBCUTANEOUS at 00:51

## 2017-03-21 RX ADMIN — CYCLOSPORINE 1 DROP: 0.5 EMULSION OPHTHALMIC at 09:01

## 2017-03-21 RX ADMIN — CARBOXYMETHYLCELLULOSE SODIUM 1 DROP: 5 SOLUTION/ DROPS OPHTHALMIC at 08:59

## 2017-03-21 RX ADMIN — CETIRIZINE HYDROCHLORIDE 10 MG: 10 TABLET, FILM COATED ORAL at 09:15

## 2017-03-21 RX ADMIN — INSULIN ASPART 3 UNITS: 100 INJECTION, SOLUTION INTRAVENOUS; SUBCUTANEOUS at 05:21

## 2017-03-21 RX ADMIN — SODIUM CHLORIDE, POTASSIUM CHLORIDE, SODIUM LACTATE AND CALCIUM CHLORIDE: 600; 310; 30; 20 INJECTION, SOLUTION INTRAVENOUS at 09:46

## 2017-03-21 RX ADMIN — ACETAMINOPHEN 650 MG: 325 TABLET, FILM COATED ORAL at 01:53

## 2017-03-21 RX ADMIN — ATORVASTATIN CALCIUM 40 MG: 40 TABLET, FILM COATED ORAL at 09:15

## 2017-03-21 RX ADMIN — CARBOXYMETHYLCELLULOSE SODIUM 1 DROP: 5 SOLUTION/ DROPS OPHTHALMIC at 09:01

## 2017-03-21 RX ADMIN — INSULIN GLARGINE 26 UNITS: 100 INJECTION, SOLUTION SUBCUTANEOUS at 08:56

## 2017-03-21 RX ADMIN — INSULIN GLARGINE 26 UNITS: 100 INJECTION, SOLUTION SUBCUTANEOUS at 00:51

## 2017-03-21 ASSESSMENT — PAIN DESCRIPTION - DESCRIPTORS: DESCRIPTORS: ACHING

## 2017-03-21 NOTE — PLAN OF CARE
Problem: Discharge Planning  Goal: Discharge Planning (Adult, OB, Behavioral, Peds)  Outpatient/Observation goals to be met before discharge home:  -Diagnostic tests and consults completed and resulted:No. AM labs scheduled  -Vital signs normal or at patient baseline: Yes   -Tolerating oral intake to maintain hydration: NPO. IVF @125ml/hr  -Adequate pain control on oral analgesics: Denies pain

## 2017-03-21 NOTE — PLAN OF CARE
Problem: Discharge Planning  Goal: Discharge Planning (Adult, OB, Behavioral, Peds)  Outpatient/Observation goals to be met before discharge home:  -Diagnostic tests and consults completed and resulted. No, am labs to be assessed.  -Vital signs normal or at patient baseline: Yes.  -Tolerating oral intake to maintain hydration: NPO. IVF @125ml/hr  -Adequate pain control on oral analgesics: Yes, Tylenol sufficient.

## 2017-03-21 NOTE — PROGRESS NOTES
Medicare KUMAR form given to patient.    Jacquelyn Clark RN, BSN  Care Coordinator [facility]  Pager: [phone]  Phone: [phone]

## 2017-03-21 NOTE — PLAN OF CARE
Problem: Discharge Planning  Goal: Discharge Planning (Adult, OB, Behavioral, Peds)  Problem: Discharge Planning  Goal: Discharge Planning (Adult, OB, Behavioral, Peds)  Outpatient/Observation goals to be met before discharge home:  -Diagnostic tests and consults completed and resulted:Yes  -Vital signs normal or at patient baseline: Yes   -Tolerating oral intake to maintain hydration:Yes. Ate 100 and IV infusing at 75 ml/hr  -Adequate pain control on oral analgesics:Yes, denies complaints of pain

## 2017-03-21 NOTE — H&P
Internal Medicine History and Physical    Patient Name: Prince Agarwal MRN# [id number]   Age: 61 year old YOB: 1955     Date of Admission:3/20/2017    Primary care provider: Brenda Mejia  Date of Service: 3/20/2017  Admitting Team: [facility]         Assessment and Plan:   Prince Agarwal is a 61 year old male with a history of HTN, DM Type II, CAD s/p stenting (2009), glaucoma, and chronic pancreatitis who presented to the hospital today for ERCP.     Post-ERCP Pancreatitis. Hx of chronic calcific pancreatitis, followed at OSH in [place]. ERCP 2/27/17 with pancreatic duct stenosis dilation and stent placement, pt subsequently developed post-op pancreatitis. CT abd/pelvis 3/3 with new pancreatic duct stent; extensive peripancreatic fat stranding surrounding pancreatic head and body, no evidence of pancreatic parenchymal necrosis; re-demonstration of pancreatic ductal stones with increased distal pancreatic ductal dilatation and progressive parenchymal atrophy. Underwent repeat ERCP today, revealed persistent severe stenosis of upstream ventral duct; successful placement of 5F 3cm pancreatic stent. Post-op amylase 209 (59 pre-op), lipase 3939 (537 pre-op). Tolerated water/ice chips post-op without N/V. Currently denies acute pain, VS stable.   - Trend amylase, lipase in AM  - Continue IV LR @ 125 cc/hr  - NPO for now  - Pain currently well managed, continue tylenol PRN  - Repeat XR in 3 weeks with probable need for EGD to remove pancreatic stent (unless repeat ERCP in 4-6 weeks is planned)    DM Type II. HgbA1C 8.8% 2/2017. Reports BG ~200's PTA, ranging 158-221 today.  - Dosing 26 units (~1/2 of PTA dose) lantus tonight, tomorrow AM - re-evaluate once tolerating PO intake  - Medium SSI  - BG check Q4H  - Hold PTA metformin following ERCP    HTN, CAD. S/p stent placement 2009. On multiple BP medications PTA. BP's ranging 130-160's/70-90's post-operatively.  - Holding PTA ASA, carvedilol, HCTZ,  "lisinopril, spironolactone given ERCP - re-evaluate in AM  - Hydralazine PRN for SBP >170    Glaucoma. Continue PTA lumigan, restasis, refresh plus drops.    CODE: Full Code  Diet/IVF: NPO  DVT ppx: Mechanical  Disposition/Admission Status: Observation, likely 1-2 days pending down-trending lipase and tolerating PO intake    Jaelyn Brown PA-C  Hospitalist Service  Pager: [phone]           Chief Complaint:   Acute on Chronic Pancreatitis, s/p ERCP         HPI:   Prince Agarwal is a 61 year old male with a history of HTN, DM Type II, CAD s/p stenting (2009), COLLIN, and chronic pancreatitis who presented to the hospital today for ERCP.     Patient has had recurring pancreatitis since the age of ~19. He denies alcohol as the cause, noting his last drink was when he was 17. He has been followed primarily in [place], but was recently referred to the [facility] for more invasive measures. He underwent an initial ERCP 2/27/2017 which revealed complex stenosis. Patient was subsequently hospitalized for several days post-procedurally due to acute pancreatitis. Underwent a repeat ERCP today for continued stricture management without complication. Patient has been tolerating ice chips and water post-operatively and denies nausea/vomiting or new abdominal pain. Endorses an epigastric \"discomfort\" that has been present since his initial ERCP several weeks ago, currently stable. Otherwise denies fevers, chills, HA, chest pain, SOB, cough, dysuria, urinary frequency, diarrhea, constipation, or peripheral edema.    History obtained per patient and chart review.         Past Medical History:     Past Medical History   Diagnosis Date     Chronic pancreatitis (H)      Coronary artery disease      Diabetes (H)      type 2     Glaucoma      Heart attack (H)      Hypertension      Sleep apnea      undetermined     Reviewed & updated in [facility].         Past Surgical History:     Past Surgical History   Procedure Laterality Date "     Stent, coronary, marco       x 1     Back surgery       L4-5 fusion     Shoulder surgery       left and right     Carpal tunnel release rt/lt       right and left     Upper gi endoscopy       Endoscopic retrograde cholangiopancreatogram N/A 2/27/2017     Procedure: COMBINED ENDOSCOPIC RETROGRADE CHOLANGIOPANCREATOGRAPHY, PLACE TUBE/STENT;  Surgeon: Reuben Heredia MD;  Location:  OR     Reviewed & updated in Epic.         Social History:   Tobacco use: Never Smoker  Alcohol use: Denies  Illicit substances: Denies         Family History:     Family History   Problem Relation Age of Onset     HEART DISEASE Mother      Chronic Obstructive Pulmonary Disease Father      Reviewed & updated in Epic.         Allergies:      Allergies   Allergen Reactions     Oxycodone Itching     Creon [Pancrelipase]      Increased heart rate     Nexium [Esomeprazole]      Head ache     Toprol Xl [Metoprolol]             Medications:     Prior to Admission Medications   Prescriptions Last Dose Informant Patient Reported? Taking?   ACETAMINOPHEN PO Unknown at Unknown time  Yes No   ASPIRIN PO Past Week at Unknown time  Yes Yes   Sig: Take 325 mg by mouth daily    ATORVASTATIN CALCIUM PO Unknown at Unknown time  Yes No   Sig: Take 40 mg by mouth daily    Carvedilol (COREG PO) 3/20/2017 at Unknown time  Yes Yes   Sig: Take 6.25 mg by mouth daily   Cholecalciferol (VITAMIN D3 PO) 3/19/2017 at Unknown time  Yes Yes   Sig: Take 1,000 Units by mouth    Ezetimibe (ZETIA PO) Unknown at Unknown time  Yes No   Sig: Take 10 mg by mouth    HYDROmorphone (DILAUDID) 2 MG tablet More than a month at Unknown time  No No   Sig: Take 0.5 tablets (1 mg) by mouth every 3 hours as needed for moderate to severe pain   LISINOPRIL PO 3/19/2017 at Unknown time  Yes Yes   Sig: Take 20 mg by mouth daily    METFORMIN HCL PO 3/19/2017 at 2000  Yes Yes   Sig: Take 1,000 mg by mouth 2 times daily (with meals)   RANITIDINE HCL PO 3/20/2017 at Unknown time  Yes  Yes   Sig: Take 150 mg by mouth 2 times daily    Spironolactone (ALDACTONE PO) 3/19/2017 at Unknown time  Yes Yes   Sig: Take 25 mg by mouth 2 times daily   albuterol (PROAIR HFA/PROVENTIL HFA/VENTOLIN HFA) 108 (90 BASE) MCG/ACT Inhaler More than a month at Unknown time  Yes No   Sig: Inhale 2 puffs into the lungs every 6 hours   ammonium lactate (LAC-HYDRIN) 12 % lotion Past Week at Unknown time  Yes Yes   Sig: Apply topically 2 times daily   bimatoprost (LUMIGAN) 0.01 % SOLN 3/19/2017 at Unknown time  Yes Yes   Si drop At Bedtime   carboxymethylcellulose (REFRESH PLUS) 0.5 % SOLN ophthalmic solution 3/20/2017 at Unknown time  Yes Yes   Si drop 3 times daily as needed for dry eyes   cetirizine (ZYRTEC) 10 MG tablet Past Week at Unknown time  Yes Yes   Sig: Take 10 mg by mouth daily   cholestyramine (QUESTRAN) 4 G Packet Unknown at Unknown time  Yes No   Sig: Take 1 packet by mouth 3 times daily (with meals)   cycloSPORINE (RESTASIS) 0.05 % ophthalmic emulsion 3/19/2017 at Unknown time  Yes Yes   Si drop 2 times daily   hydrochlorothiazide (MICROZIDE) 12.5 MG capsule 3/19/2017 at Unknown time  Yes Yes   Sig: Take 12.5 mg by mouth daily   insulin aspart (NOVOLOG PEN) 100 UNIT/ML injection 3/19/2017 at Unknown time  No Yes   Sig: Inject 1-10 Units Subcutaneous 3 times daily (with meals) HIGH INSULIN RESISTANCE DOSING     Do Not give if Pre-Meal BG < 140.   140 - 164 give 1 unit.    165 - 189 give 2 units.    190 - 214 give 3 units.    215 - 239 give 4 units.   240 - 264 give 5 units.    265 - 289 give 6 units.    290 - 314 give 7 units.   315 - 339 give 8 units.   340 - 364 give 9 units.  = or > 365 give 10 units   insulin aspart (NOVOLOG PEN) 100 UNIT/ML injection 3/19/2017 at Unknown time  No Yes   Sig: Inject 1-7 Units Subcutaneous At Bedtime For  - 224 give 1 units.   For  - 249 give 2 units.   For  - 274 give 3 units.   For  - 299 give 4 units.   For  - 324 give 5 units.  "  For  - 349 give 6 units.   For BG greater than or equal to 350 give 7 units.   insulin aspart (NOVOLOG PEN) 100 UNIT/ML injection 3/19/2017 at Unknown time  No Yes   Sig: DOSE:  1 units per 10 grams of carbohydrate TID with meals and snacks     Only chart total amount of units given.  Do not give if pre-prandial glucose is less than 60 mg/dL.   insulin glargine (LANTUS SOLOSTAR) 100 UNIT/ML injection 3/20/2017 at Unknown time  No Yes   Sig: Inject 25 Units Subcutaneous 2 times daily 25 units evening   Patient taking differently: Inject 55 Units Subcutaneous 2 times daily 25 units evening   mometasone (NASONEX) 50 MCG/ACT spray 3/19/2017 at Unknown time  Yes Yes   Sig: Spray 2 sprays into both nostrils daily   vitamin D (ERGOCALCIFEROL) 65548 UNIT capsule 3/19/2017 at Unknown time  Yes Yes   Sig: Take 50,000 Units by mouth      Facility-Administered Medications: None             Review of Systems:   A complete ROS was performed and is negative other than what is stated in the HPI.          Physical Exam:   Blood pressure 157/88, temperature 97.7  F (36.5  C), temperature source Oral, resp. rate 16, height 1.75 m (5' 8.9\"), weight 113.2 kg (249 lb 9 oz), SpO2 96 %.  General: Well-appearing  male laying comfortably in bed, NAD.  HEENT: NC/AT. Anicteric sclera. EOMI. Eyes symmetric and free of discharge bilaterally. Mucous membranes moist.  Neck: Supple  CV: RRR, S1/S2. No appreciable murmurs.  PULMONARY: Normal effort on room air. Lungs CTAB without wheezes, rales or rhonchi.  GI: Soft, moderately distended. Mild epigastric tenderness. Bowel sounds normoactive.  Extremities/MSK: No peripheral edema. Warm & well perfused. No joint tenderness or swelling.  Skin: No jaundice, rashes or lesions evident on exposed skin.  Neuro: A&O X 3. Moves all extremities symmetrically. No focal deficits.    Tubes/Lines/Devices:  Peripheral IV 03/20/17 Left Hand (Active)   Site Assessment WDL 3/20/2017  6:00 PM   Line " Status Saline locked 3/20/2017  6:00 PM   Phlebitis Scale 0-->no symptoms 3/20/2017  6:00 PM   Infiltration Scale 0 3/20/2017  6:00 PM   Number of days:0       Peripheral IV 03/20/17 Right Upper forearm (Active)   Site Assessment WDL 3/20/2017  6:00 PM   Line Status Infusing 3/20/2017  6:00 PM   Phlebitis Scale 0-->no symptoms 3/20/2017  6:00 PM   Infiltration Scale 0 3/20/2017  6:00 PM   Number of days:0            Data:   I reviewed all pertinent data including CBC, CMP, INR, Lipase, Amylase, Glucose, CT Abd/pelvis

## 2017-03-21 NOTE — PLAN OF CARE
Problem: Discharge Planning  Goal: Discharge Planning (Adult, OB, Behavioral, Peds)  Outpatient/Observation goals to be met before discharge home:  -Diagnostic tests and consults completed and resulted. Yes, am labs improved, seen by consults and own team.  -Vital signs normal or at patient baseline: Yes.  -Tolerating oral intake to maintain hydration: Yes, tolerated full liquid lunch, only slight increase in discomfort, Tylenol sufficient.  -Adequate pain control on oral analgesics: Yes, Tylenol sufficient.  Discussed staying for light dinner, decide discharge this evening based on dinner tolerance. IV rate to 75ml/hr.

## 2017-03-21 NOTE — PROGRESS NOTES
Care Coordinator- Discharge Planning     Admission Date/Time:  3/20/2017  Attending MD:  Deepthi Marroquin MD     Data  Date of initial CC assessment:  3/21/17  Chart reviewed, discussed with interdisciplinary team.   Patient was admitted for: No diagnosis found.     Assessment  Concerns with insurance coverage for discharge needs: states if he is observation status, he'll leave tonight and stay in a motel.  Current Living Situation: Patient lives alone and is from [place].  Support System: Supportive and Involved  Services Involved: none  Transportation: Car and Family or Friend will provide  Barriers to Discharge: chronically ill      Coordination of Care and Referrals: spoke with patient at his bedside.  Patient has a friend that will drive him home to [place].  He plans to try to eat this evening and then will discharge and get a motel room.  Patient feels his observation status will cost him too much OOP to stay the night.  Patient requested to be switched to inpatient status.  Discussed that his decreasing Lipase and discharge are not going to qualify for inpatient status.  Confirmed this with UR as well.  Advised patient could check with his medicare on how much they cover for his observation status and how much his secondary insurance would  before he were to be billed.  Patient without further questions at this time because he has decided to leave as long as eating is tolerated.      Plan  Anticipated Discharge Date:  3/21/17  Anticipated Discharge Plan:  Home with follow up    CTS Handoff completed:  YES    Jacquelyn Clark RN, BSN  Care Coordinator [facility]  Pager: [phone]  Phone: [phone]

## 2017-03-21 NOTE — PLAN OF CARE
Problem: Discharge Planning  Goal: Discharge Planning (Adult, OB, Behavioral, Peds)  Outpatient/Observation goals to be met before discharge home:  -Diagnostic tests and consults completed and resulted:No. AM labs scheduled  -Vital signs normal or at patient baseline: Yes   -Tolerating oral intake to maintain hydration: NPO. IVF @125ml/hr  -Adequate pain control on oral analgesics:Tylenol administered for abd. discomfort  with relief

## 2017-03-21 NOTE — PROGRESS NOTES
"   [facility]  GASTROENTEROLOGY PROGRESS NOTE  Prince Agarwal [id number]   03/21/2017  - Currently feels good. Denies any abdominal pain, nausea or vomiting.     - NPO for now.   - No other symptoms.   - Lipase trended down.       OBJECTIVE:  VS: /82 (BP Location: Left arm)  Temp 97.6  F (36.4  C) (Oral)  Resp 16  Ht 1.75 m (5' 8.9\")  Wt 113.2 kg (249 lb 9 oz)  SpO2 99%  BMI 36.96 kg/m2   GEN: A&Ox3, NAD, comfortable  CV:  RRR, no M/G/R  PULM:  CTA B/L  ABD: Normoactive bowel sounds, soft, ND, NT, no HSM      REVIEW OF LABORATORY, PATHOLOGY AND IMAGING RESULTS:  BMP  Recent Labs  Lab 03/20/17  1244      POTASSIUM 4.2   CHLORIDE 104   HREMES 8.8   CO2 25   BUN 20   CR 1.03   *       CBC  Recent Labs  Lab 03/20/17  1244   WBC 6.5   RBC 4.21*   HGB 12.2*   HCT 35.3*   MCV 84   MCH 29.0   MCHC 34.6   RDW 13.1          INR  Recent Labs  Lab 03/20/17  1244   INR 1.09       LFTs  Recent Labs  Lab 03/20/17  1244   ALKPHOS 77   AST 19   ALT 59   BILITOTAL 1.0   PROTTOTAL 7.8   ALBUMIN 4.0        PANC  Recent Labs  Lab 03/21/17  0747 03/20/17  1949 03/20/17  1244   LIPASE 1668* 3939* 537*   AMYLASE 130* 209* 59       IMPRESSION:  Prince Agarwal is a 61 year old male patient with h/o chronic pancreatitis and high grade PD stricture with recent admission after a complex ERCP with concerns for duct injury is being admitted after again after a technically challenging ERCP with inability to place the stent across the stricture.     RECOMMENDATIONS:  - Advance diet as tolerated. Once starts tolerating fluids can stop IVF.   - Discussed with Dr Carolina, will be scheduled with him for repeat ERCP to attempt to gain access beyond the stricture.  - Further care as per the primary team.     It has been a pleasure to participate in the care of this patient.  Patient was discussed with GI staff, Dr. Heredia.  Please feel free to page with questions.     Chandrika Kelly MD  Therapeutic Endoscopy " [facility]  [phone]

## 2017-03-21 NOTE — PROGRESS NOTES
Discharge instruction reviewed.  Patient verbalized understanding. PIV removed, patient ambulated to the main lobby. Family awaiting at main lobby. Patient discharged

## 2017-03-21 NOTE — PLAN OF CARE
Problem: Discharge Planning  Goal: Discharge Planning (Adult, OB, Behavioral, Peds)  Outpatient/Observation goals to be met before discharge home:  -Diagnostic tests and consults completed and resulted:No. AM labs scheduled  -Vital signs normal or at patient baseline: Yes   -Tolerating oral intake to maintain hydration: NPO. IVF @125ml/hr  -Adequate pain control on oral analgesics:Denies pain

## 2017-03-22 ENCOUNTER — CARE COORDINATION (OUTPATIENT)
Dept: GASTROENTEROLOGY | Facility: CLINIC | Age: 62
End: 2017-03-22

## 2017-03-22 DIAGNOSIS — K86.1 CHRONIC PANCREATITIS (H): Primary | ICD-10-CM

## 2017-03-22 NOTE — PROGRESS NOTES
Patient was just discharged yesterday to hotel and is talking to me while he is driving and is having some upper respiratory issues, coughing and whizzing. Not having any problems taking nice deep breaths. He will follow up with primary. But does have pressure in chest, believes its respiratory. Pt notified RX will be at pharmacy, trial of 300 cap and also a regular RX if no reaction acures.

## 2017-03-22 NOTE — DISCHARGE SUMMARY
Discharge Summary    Prince Agarwal MRN# [id number]   YOB: 1955 Age: 61 year old     Date of Admission:  3/20/2017  Date of Discharge:  3/21/2017  5:53 PM  Admitting Physician:  Deepthi Marroquin MD  Discharge Physician:  Duke bullock [id number]  Discharging Service:  Internal Medicine     Primary Provider: Brenda Mejia          Discharge Diagnosis:     Post-op pain    * No resolved hospital problems. *  Post ERCP pancreatitis             Discharge Disposition:   Discharged to home           Condition on Discharge:   Discharge condition: Stable   Code status on discharge: Full Code           Procedures:   ERCP          Discharge Medications:     Discharge Medication List as of 3/21/2017  5:23 PM      CONTINUE these medications which have NOT CHANGED    Details   !! insulin aspart (NOVOLOG PEN) 100 UNIT/ML injection Inject 1-10 Units Subcutaneous 3 times daily (with meals) HIGH INSULIN RESISTANCE DOSING     Do Not give if Pre-Meal BG < 140.   140 - 164 give 1 unit.    165 - 189 give 2 units.    190 - 214 give 3 units.    215 - 239 give 4 units.   240 - 264 give 5 un its.    265 - 289 give 6 units.    290 - 314 give 7 units.   315 - 339 give 8 units.   340 - 364 give 9 units.  = or > 365 give 10 units, No Print Out      !! insulin aspart (NOVOLOG PEN) 100 UNIT/ML injection Inject 1-7 Units Subcutaneous At Bedtime For  - 224 give 1 units.   For  - 249 give 2 units.   For  - 274 give 3 units.   For  - 299 give 4 units.   For  - 324 give 5 units.   For  - 349 give 6 units.   For BG greater  than or equal to 350 give 7 units., No Print Out      !! insulin aspart (NOVOLOG PEN) 100 UNIT/ML injection DOSE:  1 units per 10 grams of carbohydrate TID with meals and snacks     Only chart total amount of units given.  Do not give if pre-prandial glucose is less than 60 mg/dL., No Print Out      insulin glargine (LANTUS SOLOSTAR) 100 UNIT/ML injection Inject 25 Units Subcutaneous 2  times daily 25 units evening, No Print Out      Carvedilol (COREG PO) Take 6.25 mg by mouth daily, Historical      Spironolactone (ALDACTONE PO) Take 25 mg by mouth 2 times daily, Historical      METFORMIN HCL PO Take 1,000 mg by mouth 2 times daily (with meals), Historical      bimatoprost (LUMIGAN) 0.01 % SOLN 1 drop At Bedtime, Historical      RANITIDINE HCL PO Take 150 mg by mouth 2 times daily , Historical      hydrochlorothiazide (MICROZIDE) 12.5 MG capsule Take 12.5 mg by mouth daily, Historical      vitamin D (ERGOCALCIFEROL) 89238 UNIT capsule Take 50,000 Units by mouth, Historical      mometasone (NASONEX) 50 MCG/ACT spray Spray 2 sprays into both nostrils daily, Historical      ASPIRIN PO Take 325 mg by mouth daily , Historical      LISINOPRIL PO Take 20 mg by mouth daily , Historical      Cholecalciferol (VITAMIN D3 PO) Take 1,000 Units by mouth , Historical      cycloSPORINE (RESTASIS) 0.05 % ophthalmic emulsion 1 drop 2 times daily, Historical      cetirizine (ZYRTEC) 10 MG tablet Take 10 mg by mouth daily, Historical      carboxymethylcellulose (REFRESH PLUS) 0.5 % SOLN ophthalmic solution 1 drop 3 times daily as needed for dry eyes, Historical      ammonium lactate (LAC-HYDRIN) 12 % lotion Apply topically 2 times dailyHistorical      HYDROmorphone (DILAUDID) 2 MG tablet Take 0.5 tablets (1 mg) by mouth every 3 hours as needed for moderate to severe pain, Disp-30 tablet, R-0, Local Print      Ezetimibe (ZETIA PO) Take 10 mg by mouth , Historical      ACETAMINOPHEN PO Historical      albuterol (PROAIR HFA/PROVENTIL HFA/VENTOLIN HFA) 108 (90 BASE) MCG/ACT Inhaler Inhale 2 puffs into the lungs every 6 hours, Historical      ATORVASTATIN CALCIUM PO Take 40 mg by mouth daily , Historical      cholestyramine (QUESTRAN) 4 G Packet Take 1 packet by mouth 3 times daily (with meals), Historical       !! - Potential duplicate medications found. Please discuss with provider.                Consultations:          GI       Brief History of Illness And Hospital Course   Prince Agarwal is a 61 year old male with a history of HTN, DM Type II, CAD s/p stenting (2009), glaucoma, and chronic pancreatitis who presented to the hospital today for ERCP.      Post-ERCP Pancreatitis. Hx of chronic calcific pancreatitis, followed at OSH in [place]. ERCP 2/27/17 with pancreatic duct stenosis dilation and stent placement, pt subsequently developed post-op pancreatitis. CT abd/pelvis 3/3 with new pancreatic duct stent; extensive peripancreatic fat stranding surrounding pancreatic head and body, no evidence of pancreatic parenchymal necrosis; re-demonstration of pancreatic ductal stones with increased distal pancreatic ductal dilatation and progressive parenchymal atrophy. Underwent repeat ERCP today, revealed persistent severe stenosis of upstream ventral duct; successful placement of 5F 3cm pancreatic stent. Post-op amylase 209 (59 pre-op), lipase 3939 (537 pre-op). Tolerated water/ice chips post-op without N/V. Currently denies acute pain, VS stable.   - Trend amylase, lipase in AM  - Continue IV LR @ 125 cc/hr  - NPO for now  - Pain currently well managed, continue tylenol PRN  - Repeat XR in 3 weeks with probable need for EGD to remove pancreatic stent (unless repeat ERCP in 4-6 weeks is planned)     DM Type II. HgbA1C 8.8% 2/2017. Reports BG ~200's PTA, ranging 158-221 today.  - Dosing 26 units (~1/2 of PTA dose) lantus tonight, tomorrow AM - re-evaluate once tolerating PO intake  - Medium SSI  - BG check Q4H  - Hold PTA metformin following ERCP     HTN, CAD. S/p stent placement 2009. On multiple BP medications PTA. BP's ranging 130-160's/70-90's post-operatively.  - Holding PTA ASA, carvedilol, HCTZ, lisinopril, spironolactone given ERCP - re-evaluate in AM  - Hydralazine PRN for SBP >170     Glaucoma. Continue PTA lumigan, restasis, refresh plus drops.           Final Day of Progress before Discharge:   Blood pressure 143/71, temperature  "97.6  F (36.4  C), temperature source Oral, resp. rate 18, height 1.75 m (5' 8.9\"), weight 113.2 kg (249 lb 9 oz), SpO2 99 %.     EXAM:  Constitutional: healthy, alert and no distress   Cardiovascular: negative, PMI normal. No lifts, heaves, or thrills. RRR. No murmurs, clicks gallops or rub  Respiratory: negative, Percussion normal. Good diaphragmatic excursion. Lungs clear  Abdomen: Abdomen soft, non-tender. BS normal. No masses, organomegaly          Data:  All laboratory data reviewed             Significant Results:   None  Lab Results   Component Value Date    WBC 6.5 03/20/2017    HGB 12.2 (L) 03/20/2017    HCT 35.3 (L) 03/20/2017     03/20/2017     03/20/2017    POTASSIUM 4.2 03/20/2017    CHLORIDE 104 03/20/2017    CO2 25 03/20/2017    BUN 20 03/20/2017    CR 1.03 03/20/2017     (H) 03/20/2017    AST 19 03/20/2017    ALT 59 03/20/2017    ALKPHOS 77 03/20/2017    BILITOTAL 1.0 03/20/2017    INR 1.09 03/20/2017      Recent Results (from the past 48 hour(s))   XR Surgery TRAMAINE G/T 5 Min Fluoro    Narrative    This exam was marked as non-reportable because it will not be read by a   radiologist or a [place] non-radiologist provider.                          Pending Results:   Unresulted Labs Ordered in the Past 30 Days of this Admission     No orders found from 1/19/2017 to 3/21/2017.                  Discharge Instructions and Follow-Up:     Discharge Procedure Orders  Reason for your hospital stay   Order Comments: Post ercp pancreatitis     Adult [facility] Follow-up and recommended labs and tests   Order Comments: Follow up with primary care provider, Brenda Meija, within 7 days for hospital follow- up.  No follow up labs or test are needed.      Appointments on [place] and/or [facility] (with [facility] or [facility] provider or service). Call [phone] if you haven't heard regarding these appointments within 7 days of discharge.     Activity   Order Comments: Your activity upon " discharge: activity as tolerated   Order Specific Question Answer Comments   Is discharge order? Yes      Discharge Instructions   Order Comments: Please follow up with your PCP and your Gastroenterologist as scheduled. Please consider calling your doctor for any concerning symptoms.     Diet   Order Comments: Follow this diet upon discharge: Orders Placed This Encounter     Full Liquid Diet advance to solid as tolerated   Order Specific Question Answer Comments   Is discharge order? Yes             Attestation:  Duke Stanton.

## 2017-04-07 ENCOUNTER — CARE COORDINATION (OUTPATIENT)
Dept: GASTROENTEROLOGY | Facility: CLINIC | Age: 62
End: 2017-04-07

## 2017-04-07 DIAGNOSIS — Z46.89 ENCOUNTER FOR REMOVAL OF PANCREATIC STENT: Primary | ICD-10-CM

## 2017-04-07 NOTE — PROGRESS NOTES
Jason called and said he wanted to know about his follow-up.   Advised he will need to get x-ray done next week, will send order to his PCP.   He wanted to know what size fr stent was placed: 5 Fr    Faxed order to his PCP for x-ray and message sent to Dr. Heredia for update on plan: see post procedure notes.     Maggie CHERRY RN Coordinator  Dr. Carolina, Dr. Heredia & Dr. Thompson  Pancreas~Biliary  [phone] #4

## 2017-04-11 ENCOUNTER — CARE COORDINATION (OUTPATIENT)
Dept: GASTROENTEROLOGY | Facility: CLINIC | Age: 62
End: 2017-04-11

## 2017-04-11 DIAGNOSIS — K85.90 PANCREATITIS: Primary | ICD-10-CM

## 2017-04-11 NOTE — PROGRESS NOTES
Received message from Sveta at [facility] again to call her back regarding x-ray order.   Ph: [phone]  Fx: [phone].    Called an left message for her to call back. Explained in last voicemail that the order for x-ray faxed to them is an electronically signed order that will not print unless electronically signed.      Maggie CHERRY RN Coordinator  Dr. Carolina, Dr. Heredia & Dr. Thompson  Pancreas~[facility]  [phone] #4

## 2017-04-11 NOTE — PROGRESS NOTES
Received message from Dr. Carolina and Dr. Heredia: they suggested Dr Carolina attempt ERCP.     Called Prince and advised of plan he is amenable to plan and will be expecting a call with date and time.   Order placed for ERCP.  Also advised that I left message for Sveta at [facility] letting her know the order they received is an electronically signed order. Explained to Prince that the orders do not print out unless electronically signed. He verbalized understanding.     Maggie CHERRY RN Coordinator  Dr. Carolina, Dr. Heredia & Dr. Thompson  Pancreas~Biliary  [phone] #4

## 2017-04-12 ENCOUNTER — TRANSFERRED RECORDS (OUTPATIENT)
Dept: HEALTH INFORMATION MANAGEMENT | Facility: CLINIC | Age: 62
End: 2017-04-12

## 2017-04-12 ENCOUNTER — TELEPHONE (OUTPATIENT)
Dept: GASTROENTEROLOGY | Facility: CLINIC | Age: 62
End: 2017-04-12

## 2017-04-12 NOTE — TELEPHONE ENCOUNTER
Prince is being referred to Dr. Carolina by Dr. Heredia for an ERCP.  He is informed that he is scheduled on 6/1/2017 at 2:15 P (patient requested this time)--advised Prince time may change by an hour or so depending on the other cases. He was okay with this.   He knows to get a pre-op. I told him he will need an updated one valid within 30 days since anesthesia will be administered. We need him to be cleared for this procedure.  He knows to arrange a  and someone to monitor him for 24 hours post procedure.  I will mail all instructions to Prince, his address in [employer] was verified during this call.     04/12/2017  [id number]

## 2017-04-13 ENCOUNTER — CARE COORDINATION (OUTPATIENT)
Dept: GASTROENTEROLOGY | Facility: CLINIC | Age: 62
End: 2017-04-13

## 2017-04-13 NOTE — PROGRESS NOTES
Abdominal xray report dated 4/12/2017 received from [facility], [address], [place] [address].  Phone number: [phone]    Called Radiology Department and verified a CD is being mailed.  Informed that it would be mailed out today.      Nataly Mccormack LPN  Advanced Endoscopy  Dr. Carolina, Dr. King & Dr. Heredia

## 2017-04-18 ENCOUNTER — TELEPHONE (OUTPATIENT)
Dept: GASTROENTEROLOGY | Facility: CLINIC | Age: 62
End: 2017-04-18

## 2017-04-24 ENCOUNTER — CARE COORDINATION (OUTPATIENT)
Dept: GASTROENTEROLOGY | Facility: CLINIC | Age: 62
End: 2017-04-24

## 2017-04-24 NOTE — PROGRESS NOTES
Stent follow-up for (ERCP 3/20/2017) with Dr. Heredia    Images reviewed by provider: Pancreatic stent; can wait until scheduled with Brown (scheduled ERCP June 1st with Dr Carolina)  Please let him know Maggie Jules   Reuben     Called Prince and advised the stent is still there and it can stay till his scheduled ERCP. States he is having some episodes of pain and had to take some pain medicine this last week. Denies vomiting, has some nausea but denies fevers. He is trying to stick to a low fat diet. Plans to stay on the pancreatic enzymes 2-3 with meals and 1-2 with snacks, but they are expensive because he has medicare. Advised the web-site does have an application that he can apply for assistance. He states his health  looked into it and states there weren't very any options for help for him.   Advised that if he starts having nausea and needs medication to call and I can send a prescription. Verbalized understanding. He plans to see his PCP on the 17th of May for a Pre-op.     Maggie CHERRY RN Coordinator  Dr. Carolina, Dr. Heredia & Dr. Thompson  Pancreas~Biliary  [phone] #4

## 2017-05-01 NOTE — PROGRESS NOTES
Called patient in regards to having ERCP and new antibiotic. He wants to take antibiotic and then get some dates from  that are available for procedure, due to needs to find a ride.   
normal...

## 2017-05-08 ENCOUNTER — CARE COORDINATION (OUTPATIENT)
Dept: GASTROENTEROLOGY | Facility: CLINIC | Age: 62
End: 2017-05-08

## 2017-05-08 NOTE — PROGRESS NOTES
Jason called and would like to reschedule his ERCP that is currently scheduled for 6/1/2017.  Prince is rescheduled for 6/13/2017, procedure time of 10:25 am with an arrival time of 8:25 am.  New instructions mailed to confirmed address in [facility].    Nataly Mccormack LPN  Advanced Endoscopy~ Panc/Bili  Dr. Carolina, Dr. King & Dr. Heredia  [phone]

## 2017-05-25 ENCOUNTER — CARE COORDINATION (OUTPATIENT)
Dept: GASTROENTEROLOGY | Facility: CLINIC | Age: 62
End: 2017-05-25

## 2017-05-25 NOTE — PROGRESS NOTES
Christianson called to up date me with his symptoms. He states he is still having loose stools which he has always been having. He states he is feeling well otherwise and has not had any pain for awhile. States he is eating food now that he hasn't been eating in a long time with no pain.   Asked if he was calling about his diarrhea and he stated no, he just wanted to update me.     He is scheduled to see Dr. Carolina on the 13th.   He just wanted to update me.     Thank you,   Maggie

## 2017-06-08 NOTE — OR NURSING
Instructed Prince to check with his PCP for instructions about how long to be off aspirin pre op.

## 2017-06-12 ENCOUNTER — ANESTHESIA EVENT (OUTPATIENT)
Dept: SURGERY | Facility: CLINIC | Age: 62
End: 2017-06-12
Payer: MEDICARE

## 2017-06-13 ENCOUNTER — HOSPITAL ENCOUNTER (OUTPATIENT)
Facility: CLINIC | Age: 62
Discharge: HOME | End: 2017-06-13
Attending: INTERNAL MEDICINE | Admitting: INTERNAL MEDICINE
Payer: MEDICARE

## 2017-06-13 ENCOUNTER — SURGERY (OUTPATIENT)
Age: 62
End: 2017-06-13

## 2017-06-13 ENCOUNTER — APPOINTMENT (OUTPATIENT)
Dept: GENERAL RADIOLOGY | Facility: CLINIC | Age: 62
End: 2017-06-13
Attending: INTERNAL MEDICINE
Payer: MEDICARE

## 2017-06-13 ENCOUNTER — ANESTHESIA (OUTPATIENT)
Dept: SURGERY | Facility: CLINIC | Age: 62
End: 2017-06-13
Payer: MEDICARE

## 2017-06-13 VITALS
HEART RATE: 78 BPM | DIASTOLIC BLOOD PRESSURE: 80 MMHG | BODY MASS INDEX: 37.71 KG/M2 | TEMPERATURE: 98.2 F | HEIGHT: 69 IN | OXYGEN SATURATION: 97 % | WEIGHT: 254.63 LBS | RESPIRATION RATE: 18 BRPM | SYSTOLIC BLOOD PRESSURE: 149 MMHG

## 2017-06-13 LAB
ALBUMIN SERPL-MCNC: 3.8 G/DL (ref 3.4–5)
ALP SERPL-CCNC: 84 U/L (ref 40–150)
ALT SERPL W P-5'-P-CCNC: 34 U/L (ref 0–70)
AMYLASE SERPL-CCNC: 33 U/L (ref 30–110)
ANION GAP SERPL CALCULATED.3IONS-SCNC: 5 MMOL/L (ref 3–14)
AST SERPL W P-5'-P-CCNC: 10 U/L (ref 0–45)
BILIRUB SERPL-MCNC: 0.8 MG/DL (ref 0.2–1.3)
BUN SERPL-MCNC: 22 MG/DL (ref 7–30)
CALCIUM SERPL-MCNC: 9.3 MG/DL (ref 8.5–10.1)
CHLORIDE SERPL-SCNC: 102 MMOL/L (ref 94–109)
CO2 SERPL-SCNC: 27 MMOL/L (ref 20–32)
CREAT SERPL-MCNC: 0.95 MG/DL (ref 0.66–1.25)
ERYTHROCYTE [DISTWIDTH] IN BLOOD BY AUTOMATED COUNT: 13.1 % (ref 10–15)
GFR SERPL CREATININE-BSD FRML MDRD: 80 ML/MIN/1.7M2
GLUCOSE BLDC GLUCOMTR-MCNC: 172 MG/DL (ref 70–99)
GLUCOSE BLDC GLUCOMTR-MCNC: 217 MG/DL (ref 70–99)
GLUCOSE SERPL-MCNC: 288 MG/DL (ref 70–99)
HCT VFR BLD AUTO: 36.5 % (ref 40–53)
HGB BLD-MCNC: 12.6 G/DL (ref 13.3–17.7)
INR PPP: 1.04 (ref 0.86–1.14)
LIPASE SERPL-CCNC: 144 U/L (ref 73–393)
MCH RBC QN AUTO: 29 PG (ref 26.5–33)
MCHC RBC AUTO-ENTMCNC: 34.5 G/DL (ref 31.5–36.5)
MCV RBC AUTO: 84 FL (ref 78–100)
PLATELET # BLD AUTO: 197 10E9/L (ref 150–450)
POTASSIUM SERPL-SCNC: 4.5 MMOL/L (ref 3.4–5.3)
PROT SERPL-MCNC: 7.8 G/DL (ref 6.8–8.8)
RBC # BLD AUTO: 4.35 10E12/L (ref 4.4–5.9)
SODIUM SERPL-SCNC: 134 MMOL/L (ref 133–144)
WBC # BLD AUTO: 9 10E9/L (ref 4–11)

## 2017-06-13 PROCEDURE — C9399 UNCLASSIFIED DRUGS OR BIOLOG: HCPCS | Performed by: NURSE ANESTHETIST, CERTIFIED REGISTERED

## 2017-06-13 PROCEDURE — 25000128 H RX IP 250 OP 636: Performed by: ANESTHESIOLOGY

## 2017-06-13 PROCEDURE — 36415 COLL VENOUS BLD VENIPUNCTURE: CPT | Performed by: INTERNAL MEDICINE

## 2017-06-13 PROCEDURE — 27210794 ZZH OR GENERAL SUPPLY STERILE: Performed by: INTERNAL MEDICINE

## 2017-06-13 PROCEDURE — C1877 STENT, NON-COAT/COV W/O DEL: HCPCS | Performed by: INTERNAL MEDICINE

## 2017-06-13 PROCEDURE — 71000014 ZZH RECOVERY PHASE 1 LEVEL 2 FIRST HR: Performed by: INTERNAL MEDICINE

## 2017-06-13 PROCEDURE — 25000128 H RX IP 250 OP 636: Performed by: NURSE ANESTHETIST, CERTIFIED REGISTERED

## 2017-06-13 PROCEDURE — 37000008 ZZH ANESTHESIA TECHNICAL FEE, 1ST 30 MIN: Performed by: INTERNAL MEDICINE

## 2017-06-13 PROCEDURE — 25000132 ZZH RX MED GY IP 250 OP 250 PS 637: Mod: GY | Performed by: ANESTHESIOLOGY

## 2017-06-13 PROCEDURE — 80053 COMPREHEN METABOLIC PANEL: CPT | Performed by: INTERNAL MEDICINE

## 2017-06-13 PROCEDURE — 82962 GLUCOSE BLOOD TEST: CPT

## 2017-06-13 PROCEDURE — C1769 GUIDE WIRE: HCPCS | Performed by: INTERNAL MEDICINE

## 2017-06-13 PROCEDURE — A9270 NON-COVERED ITEM OR SERVICE: HCPCS | Mod: GY | Performed by: INTERNAL MEDICINE

## 2017-06-13 PROCEDURE — 83690 ASSAY OF LIPASE: CPT | Performed by: INTERNAL MEDICINE

## 2017-06-13 PROCEDURE — 71000027 ZZH RECOVERY PHASE 2 EACH 15 MINS: Performed by: INTERNAL MEDICINE

## 2017-06-13 PROCEDURE — 25000125 ZZHC RX 250: Performed by: NURSE ANESTHETIST, CERTIFIED REGISTERED

## 2017-06-13 PROCEDURE — 25000125 ZZHC RX 250: Mod: GY | Performed by: INTERNAL MEDICINE

## 2017-06-13 PROCEDURE — 82150 ASSAY OF AMYLASE: CPT | Performed by: INTERNAL MEDICINE

## 2017-06-13 PROCEDURE — 85027 COMPLETE CBC AUTOMATED: CPT | Performed by: INTERNAL MEDICINE

## 2017-06-13 PROCEDURE — 37000009 ZZH ANESTHESIA TECHNICAL FEE, EACH ADDTL 15 MIN: Performed by: INTERNAL MEDICINE

## 2017-06-13 PROCEDURE — 27210995 ZZH RX 272: Performed by: INTERNAL MEDICINE

## 2017-06-13 PROCEDURE — 36000055 ZZH SURGERY LEVEL 2 W FLUORO 1ST 30 MIN - UMMC: Performed by: INTERNAL MEDICINE

## 2017-06-13 PROCEDURE — 25500064 ZZH RX 255 OP 636: Performed by: INTERNAL MEDICINE

## 2017-06-13 PROCEDURE — 36000053 ZZH SURGERY LEVEL 2 EA 15 ADDTL MIN - UMMC: Performed by: INTERNAL MEDICINE

## 2017-06-13 PROCEDURE — 85610 PROTHROMBIN TIME: CPT | Performed by: INTERNAL MEDICINE

## 2017-06-13 PROCEDURE — 40000170 ZZH STATISTIC PRE-PROCEDURE ASSESSMENT II: Performed by: INTERNAL MEDICINE

## 2017-06-13 PROCEDURE — 40000279 XR SURGERY CARM FLUORO GREATER THAN 5 MIN W STILLS: Mod: TC

## 2017-06-13 DEVICE — IMPLANTABLE DEVICE: Type: IMPLANTABLE DEVICE | Site: PANCREATIC DUCT | Status: FUNCTIONAL

## 2017-06-13 RX ORDER — PROPOFOL 10 MG/ML
INJECTION, EMULSION INTRAVENOUS PRN
Status: DISCONTINUED | OUTPATIENT
Start: 2017-06-13 | End: 2017-06-13

## 2017-06-13 RX ORDER — FENTANYL CITRATE 50 UG/ML
INJECTION, SOLUTION INTRAMUSCULAR; INTRAVENOUS PRN
Status: DISCONTINUED | OUTPATIENT
Start: 2017-06-13 | End: 2017-06-13

## 2017-06-13 RX ORDER — FENTANYL CITRATE 50 UG/ML
25-50 INJECTION, SOLUTION INTRAMUSCULAR; INTRAVENOUS
Status: DISCONTINUED | OUTPATIENT
Start: 2017-06-13 | End: 2017-06-13 | Stop reason: HOSPADM

## 2017-06-13 RX ORDER — HYDRALAZINE HYDROCHLORIDE 20 MG/ML
2.5-5 INJECTION INTRAMUSCULAR; INTRAVENOUS EVERY 10 MIN PRN
Status: DISCONTINUED | OUTPATIENT
Start: 2017-06-13 | End: 2017-06-13 | Stop reason: HOSPADM

## 2017-06-13 RX ORDER — ONDANSETRON 4 MG/1
4 TABLET, ORALLY DISINTEGRATING ORAL EVERY 30 MIN PRN
Status: DISCONTINUED | OUTPATIENT
Start: 2017-06-13 | End: 2017-06-13 | Stop reason: HOSPADM

## 2017-06-13 RX ORDER — NALOXONE HYDROCHLORIDE 0.4 MG/ML
.1-.4 INJECTION, SOLUTION INTRAMUSCULAR; INTRAVENOUS; SUBCUTANEOUS
Status: DISCONTINUED | OUTPATIENT
Start: 2017-06-13 | End: 2017-06-13 | Stop reason: HOSPADM

## 2017-06-13 RX ORDER — LIDOCAINE HYDROCHLORIDE 20 MG/ML
INJECTION, SOLUTION INFILTRATION; PERINEURAL PRN
Status: DISCONTINUED | OUTPATIENT
Start: 2017-06-13 | End: 2017-06-13

## 2017-06-13 RX ORDER — FLUMAZENIL 0.1 MG/ML
0.2 INJECTION, SOLUTION INTRAVENOUS
Status: DISCONTINUED | OUTPATIENT
Start: 2017-06-13 | End: 2017-06-13 | Stop reason: HOSPADM

## 2017-06-13 RX ORDER — ONDANSETRON 2 MG/ML
4 INJECTION INTRAMUSCULAR; INTRAVENOUS EVERY 30 MIN PRN
Status: DISCONTINUED | OUTPATIENT
Start: 2017-06-13 | End: 2017-06-13

## 2017-06-13 RX ORDER — SODIUM CHLORIDE, SODIUM LACTATE, POTASSIUM CHLORIDE, CALCIUM CHLORIDE 600; 310; 30; 20 MG/100ML; MG/100ML; MG/100ML; MG/100ML
INJECTION, SOLUTION INTRAVENOUS CONTINUOUS
Status: DISCONTINUED | OUTPATIENT
Start: 2017-06-13 | End: 2017-06-13 | Stop reason: HOSPADM

## 2017-06-13 RX ORDER — LEVOFLOXACIN 5 MG/ML
INJECTION, SOLUTION INTRAVENOUS PRN
Status: DISCONTINUED | OUTPATIENT
Start: 2017-06-13 | End: 2017-06-13

## 2017-06-13 RX ORDER — IOPAMIDOL 510 MG/ML
INJECTION, SOLUTION INTRAVASCULAR PRN
Status: DISCONTINUED | OUTPATIENT
Start: 2017-06-13 | End: 2017-06-13 | Stop reason: HOSPADM

## 2017-06-13 RX ORDER — SODIUM CHLORIDE, SODIUM LACTATE, POTASSIUM CHLORIDE, CALCIUM CHLORIDE 600; 310; 30; 20 MG/100ML; MG/100ML; MG/100ML; MG/100ML
INJECTION, SOLUTION INTRAVENOUS CONTINUOUS
Status: DISCONTINUED | OUTPATIENT
Start: 2017-06-13 | End: 2017-06-13

## 2017-06-13 RX ORDER — LIDOCAINE 40 MG/G
CREAM TOPICAL
Status: DISCONTINUED | OUTPATIENT
Start: 2017-06-13 | End: 2017-06-13 | Stop reason: HOSPADM

## 2017-06-13 RX ORDER — INDOMETHACIN 50 MG/1
100 SUPPOSITORY RECTAL
Status: DISCONTINUED | OUTPATIENT
Start: 2017-06-13 | End: 2017-06-13 | Stop reason: HOSPADM

## 2017-06-13 RX ORDER — ONDANSETRON 2 MG/ML
4 INJECTION INTRAMUSCULAR; INTRAVENOUS EVERY 30 MIN PRN
Status: DISCONTINUED | OUTPATIENT
Start: 2017-06-13 | End: 2017-06-13 | Stop reason: HOSPADM

## 2017-06-13 RX ORDER — ONDANSETRON 4 MG/1
4 TABLET, ORALLY DISINTEGRATING ORAL EVERY 30 MIN PRN
Status: DISCONTINUED | OUTPATIENT
Start: 2017-06-13 | End: 2017-06-13

## 2017-06-13 RX ORDER — LABETALOL HYDROCHLORIDE 5 MG/ML
10 INJECTION, SOLUTION INTRAVENOUS
Status: DISCONTINUED | OUTPATIENT
Start: 2017-06-13 | End: 2017-06-13 | Stop reason: HOSPADM

## 2017-06-13 RX ADMIN — SUGAMMADEX 200 MG: 100 INJECTION, SOLUTION INTRAVENOUS at 14:27

## 2017-06-13 RX ADMIN — PROPOFOL 200 MG: 10 INJECTION, EMULSION INTRAVENOUS at 13:34

## 2017-06-13 RX ADMIN — SIMETHICONE 133 MG: 63.3; 3.7 SOLUTION/ DROPS ORAL at 14:15

## 2017-06-13 RX ADMIN — MIDAZOLAM HYDROCHLORIDE 0.5 MG: 1 INJECTION, SOLUTION INTRAMUSCULAR; INTRAVENOUS at 13:28

## 2017-06-13 RX ADMIN — ROCURONIUM BROMIDE 50 MG: 10 INJECTION INTRAVENOUS at 13:34

## 2017-06-13 RX ADMIN — ONDANSETRON 4 MG: 2 INJECTION INTRAMUSCULAR; INTRAVENOUS at 14:13

## 2017-06-13 RX ADMIN — LEVOFLOXACIN 500 MG: 5 INJECTION, SOLUTION INTRAVENOUS at 13:52

## 2017-06-13 RX ADMIN — WATER 100 ML: 100 IRRIGANT IRRIGATION at 14:16

## 2017-06-13 RX ADMIN — FENTANYL CITRATE 50 MCG: 50 INJECTION, SOLUTION INTRAMUSCULAR; INTRAVENOUS at 13:34

## 2017-06-13 RX ADMIN — LIDOCAINE HYDROCHLORIDE 90 MG: 20 INJECTION, SOLUTION INFILTRATION; PERINEURAL at 13:34

## 2017-06-13 RX ADMIN — IOPAMIDOL 10 ML: 510 INJECTION, SOLUTION INTRAVASCULAR at 14:14

## 2017-06-13 RX ADMIN — SODIUM CHLORIDE, POTASSIUM CHLORIDE, SODIUM LACTATE AND CALCIUM CHLORIDE: 600; 310; 30; 20 INJECTION, SOLUTION INTRAVENOUS at 13:25

## 2017-06-13 RX ADMIN — HUMAN INSULIN 10 UNITS: 100 INJECTION, SOLUTION SUBCUTANEOUS at 11:10

## 2017-06-13 NOTE — ANESTHESIA CARE TRANSFER NOTE
Patient: Prince Agarwal    Procedure(s):  Endoscopic Retrograde Cholangiopancreatogram with pancreatic stent exchange - Wound Class: II-Clean Contaminated    Diagnosis: Bile Duct Stone   Diagnosis Additional Information: No value filed.    Anesthesia Type:   General, ETT     Note:  Airway :Face Mask  Patient transferred to:PACU        Vitals: (Last set prior to Anesthesia Care Transfer)    CRNA VITALS  6/13/2017 1403 - 6/13/2017 1437      6/13/2017             Pulse: 90    SpO2: 96 %    Resp Rate (observed): (!)  2                Electronically Signed By: ARIE Bob CRNA  June 13, 2017  2:37 PM

## 2017-06-13 NOTE — BRIEF OP NOTE
[facility], [place]    Brief Operative Note    Pre-operative diagnosis: Pancreatic Duct Stone   Post-operative diagnosis * No post-op diagnosis entered *  Procedure: Procedure(s):  Endoscopic Retrograde Cholangiopancreatogram with pancreatic stent exchange - Wound Class: II-Clean Contaminated  Surgeon: Surgeon(s) and Role:     * Gage Carolina MD - Primary     * Chandrika Kelly MD - Assisting  Anesthesia: General   Estimated blood loss: Minimal  Drains: None  Specimens: * No specimens in log *  Findings:   Previously placed PD stent was noted to be place: removed with rat tooth forceps. Placed a self ejecting stent in place.     Complications: None.  Implants: Advanix 3F x 7 cm

## 2017-06-13 NOTE — IP AVS SNAPSHOT
MRN:[id number]                      After Visit Summary   6/13/2017    Prince Agarwal    MRN: [id number]           Thank you!     Thank you for choosing [facility] for your care. Our goal is always to provide you with excellent care. Hearing back from our patients is one way we can continue to improve our services. Please take a few minutes to complete the written survey that you may receive in the mail after you visit with us. Thank you!        Patient Information     Date Of Birth          1955        About your hospital stay     You were admitted on:  June 13, 2017 You last received care in the:  Same Day Surgery [facility]    You were discharged on:  June 13, 2017       Who to Call     For medical emergencies, please call 911.  For non-urgent questions about your medical care, please call your primary care provider or clinic, [phone]  For questions related to your surgery, please call your surgery clinic        Attending Provider     Provider Specialty    Gage Carolina MD Gastroenterology       Primary Care Provider Office Phone # Fax #    Brenda Mejia [phone]      After Care Instructions     Discharge Instructions       Resume pre procedure diet            Discharge Instructions       Restart home medications.                  Further instructions from your care team       [facility]  Same-Day Surgery   Adult Discharge Orders & Instructions     For 24 hours after surgery    1. Get plenty of rest.  A responsible adult must stay with you for at least 24 hours after you leave the hospital.   2. Do not drive or use heavy equipment.  If you have weakness or tingling, don't drive or use heavy equipment until this feeling goes away.  3. Do not drink alcohol.  4. Avoid strenuous or risky activities.  Ask for help when climbing stairs.   5. You may feel lightheaded.  IF so, sit for a few minutes before standing.  Have  "someone help you get up.   6. If you have nausea (feel sick to your stomach): Drink only clear liquids such as apple juice, ginger ale, broth or 7-Up.  Rest may also help.  Be sure to drink enough fluids.  Move to a regular diet as you feel able.  7. You may have a slight fever. Call the doctor if your fever is over 100 F (37.7 C) (taken under the tongue) or lasts longer than 24 hours.  8. You may have a dry mouth, a sore throat, muscle aches or trouble sleeping.  These should go away after 24 hours.  9. Do not make important or legal decisions.   Call your doctor for any of the followin.  Signs of infection (fever, growing tenderness at the surgery site, a large amount of drainage or bleeding, severe pain, foul-smelling drainage, redness, swelling).    2. It has been over 8 to 10 hours since surgery and you are still not able to urinate (pass water).    3.  Headache for over 24 hours.    4.  Numbness, tingling or weakness the day after surgery (if you had spinal anesthesia).  To contact a doctor, call Dr Carolina's office at [phone]   or:        [phone] and ask for the resident on call for   Gastroenterology (answered 24 hours a day)      Emergency Department:    [facility]: [phone]       (TTY for hearing impaired: [phone])          Pending Results     No orders found from 2017 to 2017.            Admission Information     Date & Time Provider Department Dept. Phone    2017 Gage Carolina MD Same Day Surgery [facility] [phone]      Your Vitals Were     Blood Pressure Pulse Temperature Respirations Height Weight    140/69 78 98.2  F (36.8  C) (Oral) 18 1.753 m (5' 9\") 115.5 kg (254 lb 10.1 oz)    Pulse Oximetry BMI (Body Mass Index)                94% 37.6 kg/m2          MyChart Information     [employer] lets you send messages to your doctor, view your test results, renew your prescriptions, schedule appointments and more. To sign up, go to " "[email or web address] . Click on \"Log in\" on the left side of the screen, which will take you to the Welcome page. Then click on \"Sign up Now\" on the right side of the page.     You will be asked to enter the access code listed below, as well as some personal information. Please follow the directions to create your username and password.     Your access code is: [email or web address]  Expires: 2017  2:11 PM     Your access code will  in 90 days. If you need help or a new code, please call your [place] clinic or [phone].        Care EveryWhere ID     This is your Care EveryWhere ID. This could be used by other organizations to access your [place] medical records  [id number]           Review of your medicines      CONTINUE these medicines which may have CHANGED, or have new prescriptions. If we are uncertain of the size of tablets/capsules you have at home, strength may be listed as something that might have changed.        Dose / Directions    insulin glargine 100 UNIT/ML injection   Commonly known as:  LANTUS SOLOSTAR   This may have changed:    - how much to take  - additional instructions   Used for:  Type 2 diabetes mellitus with hyperglycemia, with long-term current use of insulin (H)        Dose:  25 Units   Inject 25 Units Subcutaneous 2 times daily 25 units evening   Refills:  0         CONTINUE these medicines which have NOT CHANGED        Dose / Directions    ACETAMINOPHEN PO        Dose:  1000 mg   Take 1,000 mg by mouth daily as needed   Refills:  0       albuterol 108 (90 BASE) MCG/ACT Inhaler   Commonly known as:  PROAIR HFA/PROVENTIL HFA/VENTOLIN HFA   Indication:  ordered in         Dose:  2 puff   Inhale 2 puffs into the lungs every 6 hours as needed for shortness of breath / dyspnea (lung symptoms)   Refills:  0       ALDACTONE PO        Dose:  25 mg   Take 25 mg by mouth 2 times daily   Refills:  0       ammonium lactate 12 % lotion   Commonly known as:  LAC-HYDRIN        Apply " topically 2 times daily   Refills:  0       ASPIRIN PO        Dose:  325 mg   Take 325 mg by mouth daily   Refills:  0       ATORVASTATIN CALCIUM PO        Dose:  40 mg   Take 40 mg by mouth daily   Refills:  0       bimatoprost 0.01 % Soln   Commonly known as:  LUMIGAN        Dose:  1 drop   1 drop At Bedtime   Refills:  0       cetirizine 10 MG tablet   Commonly known as:  zyrTEC        Dose:  10 mg   Take 10 mg by mouth daily   Refills:  0       COREG PO        Dose:  6.25 mg   Take 6.25 mg by mouth daily   Refills:  0       cycloSPORINE 0.05 % ophthalmic emulsion   Commonly known as:  RESTASIS        Dose:  1 drop   1 drop 2 times daily   Refills:  0       hydrochlorothiazide 12.5 MG capsule   Commonly known as:  MICROZIDE        Dose:  12.5 mg   Take 12.5 mg by mouth daily   Refills:  0       HYDROmorphone 2 MG tablet   Commonly known as:  DILAUDID   Used for:  Other acute pancreatitis with uninfected necrosis        Dose:  1 mg   Take 0.5 tablets (1 mg) by mouth every 3 hours as needed for moderate to severe pain   Quantity:  30 tablet   Refills:  0       * insulin aspart 100 UNIT/ML injection   Commonly known as:  NovoLOG PEN   Used for:  Type 2 diabetes mellitus with hyperglycemia, with long-term current use of insulin (H)        Dose:  1-10 Units   Inject 1-10 Units Subcutaneous 3 times daily (with meals) HIGH INSULIN RESISTANCE DOSING    Do Not give if Pre-Meal BG < 140.  140 - 164 give 1 unit.   165 - 189 give 2 units.   190 - 214 give 3 units.   215 - 239 give 4 units.  240 - 264 give 5 units.   265 - 289 give 6 units.   290 - 314 give 7 units.  315 - 339 give 8 units.  340 - 364 give 9 units. = or > 365 give 10 units   Refills:  0       * insulin aspart 100 UNIT/ML injection   Commonly known as:  NovoLOG PEN   Used for:  Type 2 diabetes mellitus with hyperglycemia, with long-term current use of insulin (H)        Dose:  1-7 Units   Inject 1-7 Units Subcutaneous At Bedtime For  - 224 give 1 units.   For  - 249 give 2 units.  For  - 274 give 3 units.  For  - 299 give 4 units.  For  - 324 give 5 units.  For  - 349 give 6 units.  For BG greater than or equal to 350 give 7 units.   Refills:  0       LISINOPRIL PO        Dose:  20 mg   Take 20 mg by mouth daily   Refills:  0       METFORMIN HCL PO        Dose:  1000 mg   Take 1,000 mg by mouth 2 times daily (with meals)   Refills:  0       mometasone 50 MCG/ACT spray   Commonly known as:  NASONEX        Dose:  2 spray   Spray 2 sprays into both nostrils daily   Refills:  0       RANITIDINE HCL PO        Dose:  150 mg   Take 150 mg by mouth 2 times daily   Refills:  0       VITAMIN D3 PO        Dose:  1000 Units   Take 1,000 Units by mouth   Refills:  0       ZETIA PO        Dose:  10 mg   Take 10 mg by mouth   Refills:  0       * Notice:  This list has 2 medication(s) that are the same as other medications prescribed for you. Read the directions carefully, and ask your doctor or other care provider to review them with you.             Protect others around you: Learn how to safely use, store and throw away your medicines at www.disposemymeds.org.             Medication List: This is a list of all your medications and when to take them. Check marks below indicate your daily home schedule. Keep this list as a reference.      Medications           Morning Afternoon Evening Bedtime As Needed    ACETAMINOPHEN PO   Take 1,000 mg by mouth daily as needed                                albuterol 108 (90 BASE) MCG/ACT Inhaler   Commonly known as:  PROAIR HFA/PROVENTIL HFA/VENTOLIN HFA   Inhale 2 puffs into the lungs every 6 hours as needed for shortness of breath / dyspnea (lung symptoms)                                ALDACTONE PO   Take 25 mg by mouth 2 times daily                                ammonium lactate 12 % lotion   Commonly known as:  LAC-HYDRIN   Apply topically 2 times daily                                ASPIRIN PO   Take 325 mg  by mouth daily                                ATORVASTATIN CALCIUM PO   Take 40 mg by mouth daily                                bimatoprost 0.01 % Soln   Commonly known as:  LUMIGAN   1 drop At Bedtime                                cetirizine 10 MG tablet   Commonly known as:  zyrTEC   Take 10 mg by mouth daily                                COREG PO   Take 6.25 mg by mouth daily                                cycloSPORINE 0.05 % ophthalmic emulsion   Commonly known as:  RESTASIS   1 drop 2 times daily                                hydrochlorothiazide 12.5 MG capsule   Commonly known as:  MICROZIDE   Take 12.5 mg by mouth daily                                HYDROmorphone 2 MG tablet   Commonly known as:  DILAUDID   Take 0.5 tablets (1 mg) by mouth every 3 hours as needed for moderate to severe pain                                * insulin aspart 100 UNIT/ML injection   Commonly known as:  NovoLOG PEN   Inject 1-10 Units Subcutaneous 3 times daily (with meals) HIGH INSULIN RESISTANCE DOSING    Do Not give if Pre-Meal BG < 140.  140 - 164 give 1 unit.   165 - 189 give 2 units.   190 - 214 give 3 units.   215 - 239 give 4 units.  240 - 264 give 5 units.   265 - 289 give 6 units.   290 - 314 give 7 units.  315 - 339 give 8 units.  340 - 364 give 9 units. = or > 365 give 10 units                                * insulin aspart 100 UNIT/ML injection   Commonly known as:  NovoLOG PEN   Inject 1-7 Units Subcutaneous At Bedtime For  - 224 give 1 units.  For  - 249 give 2 units.  For  - 274 give 3 units.  For  - 299 give 4 units.  For  - 324 give 5 units.  For  - 349 give 6 units.  For BG greater than or equal to 350 give 7 units.                                insulin glargine 100 UNIT/ML injection   Commonly known as:  LANTUS SOLOSTAR   Inject 25 Units Subcutaneous 2 times daily 25 units evening                                LISINOPRIL PO   Take 20 mg by mouth daily                                 METFORMIN HCL PO   Take 1,000 mg by mouth 2 times daily (with meals)                                mometasone 50 MCG/ACT spray   Commonly known as:  NASONEX   Spray 2 sprays into both nostrils daily                                RANITIDINE HCL PO   Take 150 mg by mouth 2 times daily                                VITAMIN D3 PO   Take 1,000 Units by mouth                                ZETIA PO   Take 10 mg by mouth                                * Notice:  This list has 2 medication(s) that are the same as other medications prescribed for you. Read the directions carefully, and ask your doctor or other care provider to review them with you.

## 2017-06-13 NOTE — OR NURSING
Blood glucose post insulin administration 217 @ 1200.  SRINIVAS Burk notified @1210 - no order for further treatment received at this time.

## 2017-06-13 NOTE — OR NURSING
Dr Burk to room to see patient.  Informed of patient's blood glucose of 288, and that he did take 65 units of Lantus this morning.  States A1C has been down into the 6's.  No coverage ordered for the blood glucose at this time.

## 2017-06-13 NOTE — ANESTHESIA PREPROCEDURE EVALUATION
Anesthesia Evaluation     .             ROS/MED HX    ENT/Pulmonary: Comment: Recent intubations Canales 2 and CMAC D: both noted as easy      Neurologic:       Cardiovascular:     (+) hypertension--CAD, --stent,. : . . . :. .       METS/Exercise Tolerance: Comment: Very active >4 METS   Hematologic:         Musculoskeletal:         GI/Hepatic: Comment: Recurrent pancreatitis (calcific)        Renal/Genitourinary:     (+) chronic renal disease, type: CRI,       Endo:     (+) type I DM, .      Psychiatric:         Infectious Disease:         Malignancy:         Other:                     Physical Exam  Normal systems: dental    Airway   Mallampati: I  TM distance: >3 FB  Neck ROM: full    Dental     Cardiovascular   Rhythm and rate: regular and normal      Pulmonary    breath sounds clear to auscultation                    Anesthesia Plan      History & Physical Review  History and physical reviewed and following examination; no interval change.    ASA Status:  3 .    NPO Status:  > 8 hours    Plan for General and ETT with Intravenous induction.     10 day mild cognitive deficit after sevo but not isoflurane.  Other factors may have contributed.      Postoperative Care  Postoperative pain management:  IV analgesics.      Consents  Anesthetic plan, risks, benefits and alternatives discussed with:  Patient..                  ANESTHESIA PREOP EVALUATION    Procedure: ENDOSCOPIC RETROGRADE CHOLANGIOPANCREATOGRAM    HPI: Bile Duct Stone     PMHx/PSHx/ROS:  Past Medical History:   Diagnosis Date     Chronic pancreatitis (H)      Coronary artery disease      Diabetes (H)     type 2     Glaucoma      Heart attack (H)      Hypertension      Sleep apnea     undetermined     Stented coronary artery        Past Surgical History:   Procedure Laterality Date     BACK SURGERY      L4-5 fusion     CARPAL TUNNEL RELEASE RT/LT      right and left     ENDOSCOPIC RETROGRADE CHOLANGIOPANCREATOGRAM N/A 2/27/2017    Procedure: COMBINED  ENDOSCOPIC RETROGRADE CHOLANGIOPANCREATOGRAPHY, PLACE TUBE/STENT;  Surgeon: Reuben Heredia MD;  Location: [facility] OR     ENDOSCOPIC RETROGRADE CHOLANGIOPANCREATOGRAM N/A 3/20/2017    Procedure: ENDOSCOPIC RETROGRADE CHOLANGIOPANCREATOGRAM;  Surgeon: Reuben Heredia MD;  Location: [facility] OR     SHOULDER SURGERY      left and right     STENT, CORONARY, TANISHA      x 1     UPPER GI ENDOSCOPY           Past Anes Hx: No personal or family h/o anesthesia problems    Soc Hx:   Tobacco:   EtOH:     Allergies:   Allergies   Allergen Reactions     Oxycodone Itching     Creon [Pancrelipase]      Increased heart rate     Nexium [Esomeprazole]      Head ache     Toprol Xl [Metoprolol]        Meds:   Prescriptions Prior to Admission   Medication Sig Dispense Refill Last Dose     HYDROmorphone (DILAUDID) 2 MG tablet Take 0.5 tablets (1 mg) by mouth every 3 hours as needed for moderate to severe pain 30 tablet 0 More than a month at Unknown time     insulin aspart (NOVOLOG PEN) 100 UNIT/ML injection Inject 1-10 Units Subcutaneous 3 times daily (with meals) HIGH INSULIN RESISTANCE DOSING     Do Not give if Pre-Meal BG < 140.   140 - 164 give 1 unit.    165 - 189 give 2 units.    190 - 214 give 3 units.    215 - 239 give 4 units.   240 - 264 give 5 units.    265 - 289 give 6 units.    290 - 314 give 7 units.   315 - 339 give 8 units.   340 - 364 give 9 units.  = or > 365 give 10 units   3/19/2017 at Unknown time     insulin aspart (NOVOLOG PEN) 100 UNIT/ML injection Inject 1-7 Units Subcutaneous At Bedtime For  - 224 give 1 units.   For  - 249 give 2 units.   For  - 274 give 3 units.   For  - 299 give 4 units.   For  - 324 give 5 units.   For  - 349 give 6 units.   For BG greater than or equal to 350 give 7 units.   3/19/2017 at Unknown time     insulin aspart (NOVOLOG PEN) 100 UNIT/ML injection DOSE:  1 units per 10 grams of carbohydrate TID with meals and snacks     Only chart total amount of  units given.  Do not give if pre-prandial glucose is less than 60 mg/dL.   3/19/2017 at Unknown time     insulin glargine (LANTUS SOLOSTAR) 100 UNIT/ML injection Inject 25 Units Subcutaneous 2 times daily 25 units evening (Patient taking differently: Inject 65 Units Subcutaneous 2 times daily 25 units evening)   3/20/2017 at Unknown time     Carvedilol (COREG PO) Take 6.25 mg by mouth daily   3/20/2017 at Unknown time     Spironolactone (ALDACTONE PO) Take 25 mg by mouth 2 times daily   3/19/2017 at Unknown time     METFORMIN HCL PO Take 1,000 mg by mouth 2 times daily (with meals)   3/19/2017 at 2000     bimatoprost (LUMIGAN) 0.01 % SOLN 1 drop At Bedtime   3/19/2017 at Unknown time     RANITIDINE HCL PO Take 150 mg by mouth 2 times daily    3/20/2017 at Unknown time     hydrochlorothiazide (MICROZIDE) 12.5 MG capsule Take 12.5 mg by mouth daily   3/19/2017 at Unknown time     Ezetimibe (ZETIA PO) Take 10 mg by mouth    Unknown at Unknown time     vitamin D (ERGOCALCIFEROL) 15544 UNIT capsule Take 50,000 Units by mouth   3/19/2017 at Unknown time     ACETAMINOPHEN PO    Unknown at Unknown time     mometasone (NASONEX) 50 MCG/ACT spray Spray 2 sprays into both nostrils daily   3/19/2017 at Unknown time     albuterol (PROAIR HFA/PROVENTIL HFA/VENTOLIN HFA) 108 (90 BASE) MCG/ACT Inhaler Inhale 2 puffs into the lungs every 6 hours   More than a month at Unknown time     ASPIRIN PO Take 325 mg by mouth daily    5/28/17     LISINOPRIL PO Take 20 mg by mouth daily    3/19/2017 at Unknown time     ATORVASTATIN CALCIUM PO Take 40 mg by mouth daily    Unknown at Unknown time     Cholecalciferol (VITAMIN D3 PO) Take 1,000 Units by mouth    3/19/2017 at Unknown time     cycloSPORINE (RESTASIS) 0.05 % ophthalmic emulsion 1 drop 2 times daily   3/19/2017 at Unknown time     cetirizine (ZYRTEC) 10 MG tablet Take 10 mg by mouth daily   Past Week at Unknown time     carboxymethylcellulose (REFRESH PLUS) 0.5 % SOLN ophthalmic  "solution 1 drop 3 times daily as needed for dry eyes   3/20/2017 at Unknown time     ammonium lactate (LAC-HYDRIN) 12 % lotion Apply topically 2 times daily   Past Week at Unknown time     cholestyramine (QUESTRAN) 4 G Packet Take 1 packet by mouth 3 times daily (with meals)   Unknown at Unknown time       No current outpatient prescriptions on file.       Physical Exam:  VS: T Data Unavailable, P Data Unavailable, BP Data Unavailable, R Data Unavailable, SpO2       Weight:   Wt Readings from Last 2 Encounters:   17 113.2 kg (249 lb 9 oz)   17 115.9 kg (255 lb 9.6 oz)     Height:   Ht Readings from Last 2 Encounters:   17 1.75 m (5' 8.9\")   17 1.753 m (5' 9\")       NPO Status:     Labs:      BMP:  Lab Results   Component Value Date     2017      Lab Results   Component Value Date    POTASSIUM 4.2 2017     Lab Results   Component Value Date    CHLORIDE 104 2017     Lab Results   Component Value Date    HERMES 8.8 2017     Lab Results   Component Value Date    CO2 25 2017     Lab Results   Component Value Date    BUN 20 2017     Lab Results   Component Value Date    CR 1.03 2017     Lab Results   Component Value Date     2017        CBC:  Lab Results   Component Value Date    WBC 6.5 2017     Lab Results   Component Value Date    HGB 12.2 2017     Lab Results   Component Value Date    HCT 35.3 2017     Lab Results   Component Value Date     2017        Imaging:        EK/13/2017  8:38 AM      "

## 2017-06-13 NOTE — DISCHARGE INSTRUCTIONS
[facility]  Same-Day Surgery   Adult Discharge Orders & Instructions     For 24 hours after surgery    1. Get plenty of rest.  A responsible adult must stay with you for at least 24 hours after you leave the hospital.   2. Do not drive or use heavy equipment.  If you have weakness or tingling, don't drive or use heavy equipment until this feeling goes away.  3. Do not drink alcohol.  4. Avoid strenuous or risky activities.  Ask for help when climbing stairs.   5. You may feel lightheaded.  IF so, sit for a few minutes before standing.  Have someone help you get up.   6. If you have nausea (feel sick to your stomach): Drink only clear liquids such as apple juice, ginger ale, broth or 7-Up.  Rest may also help.  Be sure to drink enough fluids.  Move to a regular diet as you feel able.  7. You may have a slight fever. Call the doctor if your fever is over 100 F (37.7 C) (taken under the tongue) or lasts longer than 24 hours.  8. You may have a dry mouth, a sore throat, muscle aches or trouble sleeping.  These should go away after 24 hours.  9. Do not make important or legal decisions.   Call your doctor for any of the followin.  Signs of infection (fever, growing tenderness at the surgery site, a large amount of drainage or bleeding, severe pain, foul-smelling drainage, redness, swelling).    2. It has been over 8 to 10 hours since surgery and you are still not able to urinate (pass water).    3.  Headache for over 24 hours.    4.  Numbness, tingling or weakness the day after surgery (if you had spinal anesthesia).  To contact a doctor, call Dr Carolina's office at [phone]   or:        [phone] and ask for the resident on call for   Gastroenterology (answered 24 hours a day)      Emergency Department:    [facility]: [phone]       (TTY for hearing impaired: [phone])

## 2017-06-13 NOTE — ANESTHESIA POSTPROCEDURE EVALUATION
Patient: Prince Agarwal    Procedure(s):  Endoscopic Retrograde Cholangiopancreatogram with pancreatic stent exchange - Wound Class: II-Clean Contaminated    Diagnosis:Bile Duct Stone   Diagnosis Additional Information: No value filed.    Anesthesia Type:  General, ETT    Note:  Anesthesia Post Evaluation    Patient location during evaluation: PACU  Patient participation: Able to fully participate in evaluation  Level of consciousness: awake  Pain management: adequate  Airway patency: patent  Cardiovascular status: acceptable  Respiratory status: acceptable  Hydration status: acceptable  PONV: none     Anesthetic complications: None          Last vitals:  Vitals:    06/13/17 1435 06/13/17 1445 06/13/17 1500   BP:  144/69 133/73   Pulse:      Resp: 16 16 18   Temp: 36.3  C (97.4  F)  36.6  C (97.9  F)   SpO2: 100% 100% 97%         Electronically Signed By: Bahman Burk MD  June 13, 2017  3:25 PM

## 2017-06-13 NOTE — IP AVS SNAPSHOT
[facility] [address] [address]    Phone:  [phone]                                       After Visit Summary   6/13/2017    Prince Agarwal    MRN: [id number]           After Visit Summary Signature Page     I have received my discharge instructions, and my questions have been answered. I have discussed any challenges I see with this plan with the nurse or doctor.    ..........................................................................................................................................  Patient/Patient Representative Signature      ..........................................................................................................................................  Patient Representative Print Name and Relationship to Patient    ..................................................               ................................................  Date                                            Time    ..........................................................................................................................................  Reviewed by Signature/Title    ...................................................              ..............................................  Date                                                            Time

## 2017-06-15 ENCOUNTER — CARE COORDINATION (OUTPATIENT)
Dept: GASTROENTEROLOGY | Facility: CLINIC | Age: 62
End: 2017-06-15

## 2017-06-15 LAB — ERCP: NORMAL

## 2017-06-15 NOTE — PROGRESS NOTES
Post ERCP (6/13/2017) with Dr. Carolina: Follow-up    Post procedure recommendations: Discharge patient to home (ambulatory). - Advance diet as tolerated today. - Confirm spontaneous stent passage by performing a KUB x-ray in 4 weeks. If stent still in place an upper endoscopy can be done locally for stent removal. Given the lack of symptoms would not attempt treatment of intraductal stones.     Patient states he is doing well. Denies N/V/F and pain. He will check with his GI locally and then let me know if they are willing to take care of x-ray and stent removal if needed so I can send records and orders if needed.     Orders placed: X-ray    Contact information verified for future questions/concerns.    Maggie CHERRY RN Coordinator  Dr. Carolina, Dr. Heredia & Dr. King  Pancreas~Biliary  [phone]

## 2017-07-10 ENCOUNTER — TRANSFERRED RECORDS (OUTPATIENT)
Dept: HEALTH INFORMATION MANAGEMENT | Facility: CLINIC | Age: 62
End: 2017-07-10

## 2017-07-21 ENCOUNTER — CARE COORDINATION (OUTPATIENT)
Dept: GASTROENTEROLOGY | Facility: CLINIC | Age: 62
End: 2017-07-21

## 2017-07-21 NOTE — PROGRESS NOTES
Called Prince to see if he had his x-ray, states he had it and was told the stent passed by his local GI. He states they were to send all the information to us as well. Advised we have not received anything yet. He will call and make sure it was sent to us. Advised will need the images sent to us on a disc for Dr. Carolina to review.   Verbalized understanding.     Maggie CHERRY RN Coordinator  Dr. Carolina, Dr. Heredia & Dr. King   [facility] Endoscopy  [phone]

## 2017-08-07 ENCOUNTER — CARE COORDINATION (OUTPATIENT)
Dept: GASTROENTEROLOGY | Facility: CLINIC | Age: 62
End: 2017-08-07

## 2017-08-07 NOTE — PROGRESS NOTES
Called Prince, he had his x-ray done.    Need images. Called [facility] [phone].    They will mail the images on a disc along with the final read.     Maggie CHERRY RN Coordinator  Dr. Carolina, Dr. Heredia & Dr. King   [facility]  [phone]

## 2017-08-15 ENCOUNTER — CARE COORDINATION (OUTPATIENT)
Dept: GASTROENTEROLOGY | Facility: CLINIC | Age: 62
End: 2017-08-15

## 2017-08-15 NOTE — PROGRESS NOTES
Stent follow-up for X-ray due 7/11/2017 with Dr. Carolina.    Images reviewed by provider: Stent gone     As long as doing well, no fu     FU prn     Called patient advised of the above information.   Verbalized understanding.     Maggie CHERRY RN Coordinator  Dr. Carolina, Dr. Heredia & Dr. King   [facility] End[facility]  [phone]

## 2017-12-01 ENCOUNTER — CARE COORDINATION (OUTPATIENT)
Dept: GASTROENTEROLOGY | Facility: CLINIC | Age: 62
End: 2017-12-01

## 2017-12-01 NOTE — PROGRESS NOTES
Returned call to Prince: he has questions about his billing. He is getting billed but he has no idea what it is for and cannot get information as to why he is getting billed. Has tried to call pharmacy but they never called back and billing and they only sent him a 26 page itemized bill but it doesn't say what his end bill is for.     Apologized but this RN is not the best person to talk to for billing. He did say he was directed to call because he was told maybe the MD billed him incorrectly and this was the way he could get a hold of the MD. Gave him the financial counselors office and then patient relations since he has not had satisfactory answers with billing and the pharmacy to figure out his hospital bill.       In regards to his pancreas: he states he has good days and bad days but continues to work with his dietitian and figuring out what foods work for him. He has tried pancreatic enzymes in the past and is not tolerant of them, they are very expensive as well.     He has this RN's contact information should he need it in the future.     Maggie CHERRY, RN Coordinator  Dr. Carolina, Dr. Heredia & Dr. King   [facility] Endoscopy  [phone]

## 2019-06-26 NOTE — PROGRESS NOTES
Disc along with final read received.   Final read will be scanned into EPIC and disc will be taken down to be uploaded.   Routing to [facility].     08/10/2017  214p   [Initial Visit] : an initial visit for [Knee Pain] : knee pain

## (undated) DEVICE — BIOPSY VALVE BIOSHIELD 00711135

## (undated) DEVICE — ENDO TUBING CO2 SMARTCAP STERILE DISP 100145CO2EXT

## (undated) DEVICE — TAPE DURAPORE 3" SILK 1538-3

## (undated) DEVICE — Device

## (undated) DEVICE — WIRE GUIDE 0.025"X450CM ANG VISIGLIDE G-240-2545A

## (undated) DEVICE — SOL WATER IRRIG 1000ML BOTTLE 07139-09

## (undated) DEVICE — PACK ENDOSCOPY GI CUSTOM UMMC

## (undated) DEVICE — GUIDEWIRE NOVAGOLD .018X480CM STR TIP M00552010

## (undated) DEVICE — ENDO DEVICE LOCKING AND BIOPSY CAP M00545261

## (undated) DEVICE — CATH BALLOON ELATION ESOPH/PYLORIC 6-7-8MMX180CM EPB6

## (undated) DEVICE — LABEL MEDICATION SYSTEM 3303-P

## (undated) DEVICE — CANNULA BILIARY ERCP .035" TAPER TIP 3.2MM CHANNEL

## (undated) DEVICE — WIRE GUIDE TRACER METRO DIRECT .021"X260CM STR TIP G55705

## (undated) DEVICE — ENDO CATH BALLOON DILATION HURRICANE 04MMX4X180CM M00545900

## (undated) DEVICE — INFLATION DEVICE BIG 60 ENDO-AN6012

## (undated) DEVICE — SUCTION MANIFOLD DORNOCH ULTRA CART UL-CL500

## (undated) DEVICE — WIRE GUIDE 0.025"X450CM STR VISIGLIDE G-240-2545S

## (undated) DEVICE — ENDO ADPT CATH ROTATABLE SIDE ARM G22677

## (undated) DEVICE — SOL WATER IRRIG 1000ML BOTTLE 2F7114

## (undated) DEVICE — GLOVE SENSICARE PI POWDER FREE 7.5 LATEX FREE MSG9075

## (undated) DEVICE — ENDO BITE BLOCK ADULT OMNI-BLOC

## (undated) DEVICE — CANNULA BILIARY FLUROTIP ERCP .035"X210CM TAPER TIP

## (undated) DEVICE — ENDO FUSION OMNI-TOME G31903

## (undated) DEVICE — CATH RETRIEVAL BALLOON EXTRACTOR PRO RX-S INJ ABOVE 9-12MM

## (undated) DEVICE — ENDO FUSION OMNI-TOME 21 FS-OMNI-21 G48675

## (undated) DEVICE — GUIDEWIRE NOVAGOLD .018X260CM STR TIP M00552000

## (undated) DEVICE — CANNULA BILIARY CONTOUR ERCP .018"X210CM 5-4-3 TIP

## (undated) DEVICE — STENT ZIMMON PANCREA 3FRX12CM SGL PIGTAIL NO FLAP G24565
Type: IMPLANTABLE DEVICE | Site: PANCREATIC DUCT | Status: NON-FUNCTIONAL
Removed: 2017-03-20

## (undated) DEVICE — KIT ENDO FIRST STEP DISINFECTANT 200ML W/POUCH EP-4

## (undated) DEVICE — WIRE GUIDE 0.025"X270CM STR VISIGLIDE G-240-2527S

## (undated) DEVICE — WIRE GUIDE 0.025"X270CM ANG VISIGLIDE G-240-2527A

## (undated) DEVICE — WIRE GUIDE ROADRUNNER X-SUPPORT .018X480CM STR TIP G22419

## (undated) RX ORDER — FENTANYL CITRATE 50 UG/ML
INJECTION, SOLUTION INTRAMUSCULAR; INTRAVENOUS
Status: DISPENSED
Start: 2017-06-13

## (undated) RX ORDER — LIDOCAINE HYDROCHLORIDE 20 MG/ML
INJECTION, SOLUTION EPIDURAL; INFILTRATION; INTRACAUDAL; PERINEURAL
Status: DISPENSED
Start: 2017-06-13

## (undated) RX ORDER — HYDROMORPHONE HYDROCHLORIDE 1 MG/ML
INJECTION, SOLUTION INTRAMUSCULAR; INTRAVENOUS; SUBCUTANEOUS
Status: DISPENSED
Start: 2017-02-27

## (undated) RX ORDER — SODIUM CHLORIDE, SODIUM LACTATE, POTASSIUM CHLORIDE, CALCIUM CHLORIDE 600; 310; 30; 20 MG/100ML; MG/100ML; MG/100ML; MG/100ML
INJECTION, SOLUTION INTRAVENOUS
Status: DISPENSED
Start: 2017-02-27

## (undated) RX ORDER — HYDRALAZINE HYDROCHLORIDE 20 MG/ML
INJECTION INTRAMUSCULAR; INTRAVENOUS
Status: DISPENSED
Start: 2017-02-27

## (undated) RX ORDER — ONDANSETRON 2 MG/ML
INJECTION INTRAMUSCULAR; INTRAVENOUS
Status: DISPENSED
Start: 2017-06-13

## (undated) RX ORDER — SODIUM CHLORIDE, SODIUM LACTATE, POTASSIUM CHLORIDE, CALCIUM CHLORIDE 600; 310; 30; 20 MG/100ML; MG/100ML; MG/100ML; MG/100ML
INJECTION, SOLUTION INTRAVENOUS
Status: DISPENSED
Start: 2017-03-20

## (undated) RX ORDER — PHENYLEPHRINE HCL IN 0.9% NACL 1 MG/10 ML
SYRINGE (ML) INTRAVENOUS
Status: DISPENSED
Start: 2017-06-13

## (undated) RX ORDER — FENTANYL CITRATE 50 UG/ML
INJECTION, SOLUTION INTRAMUSCULAR; INTRAVENOUS
Status: DISPENSED
Start: 2017-02-27

## (undated) RX ORDER — GLYCOPYRROLATE 0.2 MG/ML
INJECTION, SOLUTION INTRAMUSCULAR; INTRAVENOUS
Status: DISPENSED
Start: 2017-06-13

## (undated) RX ORDER — LEVOFLOXACIN 5 MG/ML
INJECTION, SOLUTION INTRAVENOUS
Status: DISPENSED
Start: 2017-06-13

## (undated) RX ORDER — EPHEDRINE SULFATE 50 MG/ML
INJECTION, SOLUTION INTRAMUSCULAR; INTRAVENOUS; SUBCUTANEOUS
Status: DISPENSED
Start: 2017-06-13

## (undated) RX ORDER — FENTANYL CITRATE 50 UG/ML
INJECTION, SOLUTION INTRAMUSCULAR; INTRAVENOUS
Status: DISPENSED
Start: 2017-03-20

## (undated) RX ORDER — PROPOFOL 10 MG/ML
INJECTION, EMULSION INTRAVENOUS
Status: DISPENSED
Start: 2017-06-13